# Patient Record
Sex: FEMALE | Race: BLACK OR AFRICAN AMERICAN | NOT HISPANIC OR LATINO
[De-identification: names, ages, dates, MRNs, and addresses within clinical notes are randomized per-mention and may not be internally consistent; named-entity substitution may affect disease eponyms.]

---

## 2017-01-12 LAB
INR PPP: 2.16 RATIO
PT BLD: 24.6 SEC

## 2017-01-13 ENCOUNTER — MESSAGE (OUTPATIENT)
Age: 32
End: 2017-01-13

## 2017-01-18 LAB
INR PPP: 2.86 RATIO
PT BLD: 32.7 SEC

## 2017-02-01 LAB
INR PPP: 2.16 RATIO
PT BLD: 24.6 SEC

## 2017-02-02 ENCOUNTER — RX RENEWAL (OUTPATIENT)
Age: 32
End: 2017-02-02

## 2017-02-14 ENCOUNTER — APPOINTMENT (OUTPATIENT)
Dept: VASCULAR SURGERY | Facility: CLINIC | Age: 32
End: 2017-02-14

## 2017-02-14 ENCOUNTER — APPOINTMENT (OUTPATIENT)
Age: 32
End: 2017-02-14

## 2017-03-10 ENCOUNTER — MESSAGE (OUTPATIENT)
Age: 32
End: 2017-03-10

## 2017-04-25 ENCOUNTER — MESSAGE (OUTPATIENT)
Age: 32
End: 2017-04-25

## 2017-05-08 ENCOUNTER — FORM ENCOUNTER (OUTPATIENT)
Age: 32
End: 2017-05-08

## 2017-05-09 ENCOUNTER — APPOINTMENT (OUTPATIENT)
Age: 32
End: 2017-05-09

## 2017-07-10 ENCOUNTER — APPOINTMENT (OUTPATIENT)
Dept: VASCULAR SURGERY | Facility: CLINIC | Age: 32
End: 2017-07-10

## 2017-07-25 ENCOUNTER — MESSAGE (OUTPATIENT)
Age: 32
End: 2017-07-25

## 2017-08-01 ENCOUNTER — MESSAGE (OUTPATIENT)
Age: 32
End: 2017-08-01

## 2017-08-10 ENCOUNTER — RX RENEWAL (OUTPATIENT)
Age: 32
End: 2017-08-10

## 2017-08-16 ENCOUNTER — MESSAGE (OUTPATIENT)
Age: 32
End: 2017-08-16

## 2017-08-24 ENCOUNTER — FORM ENCOUNTER (OUTPATIENT)
Age: 32
End: 2017-08-24

## 2017-08-25 ENCOUNTER — APPOINTMENT (OUTPATIENT)
Age: 32
End: 2017-08-25
Payer: MEDICAID

## 2017-08-25 PROCEDURE — 36902Z: CUSTOM

## 2017-09-18 ENCOUNTER — FORM ENCOUNTER (OUTPATIENT)
Age: 32
End: 2017-09-18

## 2017-09-19 ENCOUNTER — APPOINTMENT (OUTPATIENT)
Age: 32
End: 2017-09-19
Payer: MEDICAID

## 2017-09-19 PROCEDURE — 36902Z: CUSTOM

## 2017-09-19 PROCEDURE — 36907Z: CUSTOM | Mod: 59

## 2017-09-19 PROCEDURE — 36215Z: CUSTOM | Mod: 59

## 2017-10-26 ENCOUNTER — APPOINTMENT (OUTPATIENT)
Dept: VASCULAR SURGERY | Facility: CLINIC | Age: 32
End: 2017-10-26

## 2017-11-15 ENCOUNTER — RX RENEWAL (OUTPATIENT)
Age: 32
End: 2017-11-15

## 2017-11-17 ENCOUNTER — RX RENEWAL (OUTPATIENT)
Age: 32
End: 2017-11-17

## 2017-11-21 ENCOUNTER — APPOINTMENT (OUTPATIENT)
Dept: VASCULAR SURGERY | Facility: CLINIC | Age: 32
End: 2017-11-21

## 2017-11-22 ENCOUNTER — RX RENEWAL (OUTPATIENT)
Age: 32
End: 2017-11-22

## 2017-11-24 ENCOUNTER — CHART COPY (OUTPATIENT)
Age: 32
End: 2017-11-24

## 2017-11-24 ENCOUNTER — MESSAGE (OUTPATIENT)
Age: 32
End: 2017-11-24

## 2017-11-24 LAB
INR PPP: 5.14
PT BLD: 60

## 2018-01-04 ENCOUNTER — APPOINTMENT (OUTPATIENT)
Dept: OBGYN | Facility: CLINIC | Age: 33
End: 2018-01-04

## 2018-01-09 ENCOUNTER — LABORATORY RESULT (OUTPATIENT)
Age: 33
End: 2018-01-09

## 2018-01-09 ENCOUNTER — APPOINTMENT (OUTPATIENT)
Dept: OBGYN | Facility: CLINIC | Age: 33
End: 2018-01-09
Payer: MEDICAID

## 2018-01-09 ENCOUNTER — OUTPATIENT (OUTPATIENT)
Dept: OUTPATIENT SERVICES | Facility: HOSPITAL | Age: 33
LOS: 1 days | End: 2018-01-09
Payer: MEDICAID

## 2018-01-09 VITALS
WEIGHT: 190.25 LBS | DIASTOLIC BLOOD PRESSURE: 88 MMHG | HEIGHT: 59 IN | BODY MASS INDEX: 38.36 KG/M2 | SYSTOLIC BLOOD PRESSURE: 140 MMHG

## 2018-01-09 DIAGNOSIS — N76.0 ACUTE VAGINITIS: ICD-10-CM

## 2018-01-09 DIAGNOSIS — N83.209 UNSPECIFIED OVARIAN CYST, UNSPECIFIED SIDE: ICD-10-CM

## 2018-01-09 DIAGNOSIS — Z30.431 ENCOUNTER FOR ROUTINE CHECKING OF INTRAUTERINE CONTRACEPTIVE DEVICE: ICD-10-CM

## 2018-01-09 DIAGNOSIS — Z01.419 ENCOUNTER FOR GYNECOLOGICAL EXAMINATION (GENERAL) (ROUTINE) W/OUT ABNORMAL FINDINGS: ICD-10-CM

## 2018-01-09 DIAGNOSIS — Z98.89 OTHER SPECIFIED POSTPROCEDURAL STATES: Chronic | ICD-10-CM

## 2018-01-09 DIAGNOSIS — N83.201 UNSPECIFIED OVARIAN CYST, RIGHT SIDE: ICD-10-CM

## 2018-01-09 DIAGNOSIS — N83.202 UNSPECIFIED OVARIAN CYST, RIGHT SIDE: ICD-10-CM

## 2018-01-09 PROCEDURE — 99395 PREV VISIT EST AGE 18-39: CPT | Mod: NC

## 2018-01-09 PROCEDURE — G0463: CPT

## 2018-01-09 PROCEDURE — 87624 HPV HI-RISK TYP POOLED RSLT: CPT

## 2018-01-10 LAB
C TRACH RRNA SPEC QL NAA+PROBE: SIGNIFICANT CHANGE UP
HPV HIGH+LOW RISK DNA PNL CVX: SIGNIFICANT CHANGE UP
N GONORRHOEA RRNA SPEC QL NAA+PROBE: SIGNIFICANT CHANGE UP
SPECIMEN SOURCE: SIGNIFICANT CHANGE UP

## 2018-01-11 LAB — CYTOLOGY SPEC DOC CYTO: SIGNIFICANT CHANGE UP

## 2018-01-12 DIAGNOSIS — Z30.431 ENCOUNTER FOR ROUTINE CHECKING OF INTRAUTERINE CONTRACEPTIVE DEVICE: ICD-10-CM

## 2018-01-12 DIAGNOSIS — N83.209 UNSPECIFIED OVARIAN CYST, UNSPECIFIED SIDE: ICD-10-CM

## 2018-01-12 DIAGNOSIS — Z01.419 ENCOUNTER FOR GYNECOLOGICAL EXAMINATION (GENERAL) (ROUTINE) WITHOUT ABNORMAL FINDINGS: ICD-10-CM

## 2018-01-18 ENCOUNTER — APPOINTMENT (OUTPATIENT)
Dept: ULTRASOUND IMAGING | Facility: IMAGING CENTER | Age: 33
End: 2018-01-18
Payer: MEDICAID

## 2018-01-18 ENCOUNTER — OUTPATIENT (OUTPATIENT)
Dept: OUTPATIENT SERVICES | Facility: HOSPITAL | Age: 33
LOS: 1 days | End: 2018-01-18
Payer: MEDICAID

## 2018-01-18 DIAGNOSIS — N83.201 UNSPECIFIED OVARIAN CYST, RIGHT SIDE: ICD-10-CM

## 2018-01-18 DIAGNOSIS — N83.202 UNSPECIFIED OVARIAN CYST, LEFT SIDE: ICD-10-CM

## 2018-01-18 DIAGNOSIS — Z98.89 OTHER SPECIFIED POSTPROCEDURAL STATES: Chronic | ICD-10-CM

## 2018-01-18 PROCEDURE — 76830 TRANSVAGINAL US NON-OB: CPT

## 2018-01-18 PROCEDURE — 76856 US EXAM PELVIC COMPLETE: CPT

## 2018-01-18 PROCEDURE — 76830 TRANSVAGINAL US NON-OB: CPT | Mod: 26

## 2018-01-18 PROCEDURE — 76856 US EXAM PELVIC COMPLETE: CPT | Mod: 26

## 2018-01-19 ENCOUNTER — OUTPATIENT (OUTPATIENT)
Dept: OUTPATIENT SERVICES | Facility: HOSPITAL | Age: 33
LOS: 1 days | Discharge: HOME | End: 2018-01-19

## 2018-01-19 DIAGNOSIS — Z98.89 OTHER SPECIFIED POSTPROCEDURAL STATES: Chronic | ICD-10-CM

## 2018-01-19 DIAGNOSIS — D68.59 OTHER PRIMARY THROMBOPHILIA: ICD-10-CM

## 2018-01-23 ENCOUNTER — RESULT REVIEW (OUTPATIENT)
Age: 33
End: 2018-01-23

## 2018-01-23 ENCOUNTER — APPOINTMENT (OUTPATIENT)
Dept: HEMATOLOGY ONCOLOGY | Facility: CLINIC | Age: 33
End: 2018-01-23
Payer: MEDICAID

## 2018-01-23 VITALS
TEMPERATURE: 98.6 F | DIASTOLIC BLOOD PRESSURE: 87 MMHG | OXYGEN SATURATION: 98 % | HEART RATE: 96 BPM | BODY MASS INDEX: 37.85 KG/M2 | SYSTOLIC BLOOD PRESSURE: 132 MMHG | RESPIRATION RATE: 16 BRPM | WEIGHT: 187.39 LBS

## 2018-01-23 DIAGNOSIS — Z51.81 ENCOUNTER FOR THERAPEUTIC DRUG LVL MONITORING: ICD-10-CM

## 2018-01-23 DIAGNOSIS — Z79.01 ENCOUNTER FOR THERAPEUTIC DRUG LVL MONITORING: ICD-10-CM

## 2018-01-23 LAB
BASOPHILS # BLD AUTO: 0.1 K/UL — SIGNIFICANT CHANGE UP (ref 0–0.2)
BASOPHILS NFR BLD AUTO: 1.1 % — SIGNIFICANT CHANGE UP (ref 0–2)
EOSINOPHIL # BLD AUTO: 0.4 K/UL — SIGNIFICANT CHANGE UP (ref 0–0.5)
EOSINOPHIL NFR BLD AUTO: 6.2 % — HIGH (ref 0–6)
HCT VFR BLD CALC: 25.3 % — LOW (ref 34.5–45)
HGB BLD-MCNC: 8.9 G/DL — LOW (ref 11.5–15.5)
INR PPP: 2.82 RATIO
LYMPHOCYTES # BLD AUTO: 1.4 K/UL — SIGNIFICANT CHANGE UP (ref 1–3.3)
LYMPHOCYTES # BLD AUTO: 20 % — SIGNIFICANT CHANGE UP (ref 13–44)
MCHC RBC-ENTMCNC: 34.6 PG — HIGH (ref 27–34)
MCHC RBC-ENTMCNC: 35.1 G/DL — SIGNIFICANT CHANGE UP (ref 32–36)
MCV RBC AUTO: 98.5 FL — SIGNIFICANT CHANGE UP (ref 80–100)
MONOCYTES # BLD AUTO: 0.5 K/UL — SIGNIFICANT CHANGE UP (ref 0–0.9)
MONOCYTES NFR BLD AUTO: 6.3 % — SIGNIFICANT CHANGE UP (ref 2–14)
NEUTROPHILS # BLD AUTO: 4.8 K/UL — SIGNIFICANT CHANGE UP (ref 1.8–7.4)
NEUTROPHILS NFR BLD AUTO: 66.4 % — SIGNIFICANT CHANGE UP (ref 43–77)
PLATELET # BLD AUTO: 233 K/UL — SIGNIFICANT CHANGE UP (ref 150–400)
PT BLD: 32.6 SEC
RBC # BLD: 2.56 M/UL — LOW (ref 3.8–5.2)
RBC # FLD: 13.8 % — SIGNIFICANT CHANGE UP (ref 10.3–14.5)
WBC # BLD: 7.2 K/UL — SIGNIFICANT CHANGE UP (ref 3.8–10.5)
WBC # FLD AUTO: 7.2 K/UL — SIGNIFICANT CHANGE UP (ref 3.8–10.5)

## 2018-01-23 PROCEDURE — 99215 OFFICE O/P EST HI 40 MIN: CPT

## 2018-01-23 RX ORDER — OMEPRAZOLE 40 MG/1
40 CAPSULE, DELAYED RELEASE ORAL
Qty: 60 | Refills: 0 | Status: DISCONTINUED | COMMUNITY
Start: 2017-12-15

## 2018-01-24 LAB
ALBUMIN SERPL ELPH-MCNC: 4.2 G/DL
ALP BLD-CCNC: 71 U/L
ALT SERPL-CCNC: 4 U/L
ANION GAP SERPL CALC-SCNC: 17 MMOL/L
AST SERPL-CCNC: 11 U/L
BILIRUB SERPL-MCNC: 0.7 MG/DL
BUN SERPL-MCNC: 23 MG/DL
CALCIUM SERPL-MCNC: 8.9 MG/DL
CHLORIDE SERPL-SCNC: 93 MMOL/L
CO2 SERPL-SCNC: 31 MMOL/L
CREAT SERPL-MCNC: 8.25 MG/DL
GLUCOSE SERPL-MCNC: 106 MG/DL
LDH SERPL-CCNC: 212 U/L
POTASSIUM SERPL-SCNC: 4.1 MMOL/L
PROT SERPL-MCNC: 7.7 G/DL
SODIUM SERPL-SCNC: 141 MMOL/L

## 2018-04-26 ENCOUNTER — FORM ENCOUNTER (OUTPATIENT)
Age: 33
End: 2018-04-26

## 2018-04-27 ENCOUNTER — APPOINTMENT (OUTPATIENT)
Dept: ENDOVASCULAR SURGERY | Facility: CLINIC | Age: 33
End: 2018-04-27
Payer: MEDICAID

## 2018-04-27 PROCEDURE — 36906Z: CUSTOM

## 2018-04-27 PROCEDURE — 36215Z: CUSTOM | Mod: 59

## 2018-05-07 ENCOUNTER — APPOINTMENT (OUTPATIENT)
Dept: VASCULAR SURGERY | Facility: CLINIC | Age: 33
End: 2018-05-07

## 2018-05-14 ENCOUNTER — APPOINTMENT (OUTPATIENT)
Dept: VASCULAR SURGERY | Facility: CLINIC | Age: 33
End: 2018-05-14
Payer: MEDICAID

## 2018-05-14 PROCEDURE — G0365: CPT | Mod: 59

## 2018-05-14 PROCEDURE — 99214 OFFICE O/P EST MOD 30 MIN: CPT

## 2018-05-14 PROCEDURE — 93990 DOPPLER FLOW TESTING: CPT

## 2018-06-21 ENCOUNTER — FORM ENCOUNTER (OUTPATIENT)
Age: 33
End: 2018-06-21

## 2018-06-22 ENCOUNTER — APPOINTMENT (OUTPATIENT)
Dept: ENDOVASCULAR SURGERY | Facility: CLINIC | Age: 33
End: 2018-06-22
Payer: MEDICAID

## 2018-06-22 PROCEDURE — 36902Z: CUSTOM

## 2018-07-23 ENCOUNTER — APPOINTMENT (OUTPATIENT)
Dept: ENDOVASCULAR SURGERY | Facility: CLINIC | Age: 33
End: 2018-07-23
Payer: MEDICAID

## 2018-07-23 PROCEDURE — 36906Z: CUSTOM

## 2018-07-27 ENCOUNTER — INBOUND DOCUMENT (OUTPATIENT)
Age: 33
End: 2018-07-27

## 2018-09-03 ENCOUNTER — FORM ENCOUNTER (OUTPATIENT)
Age: 33
End: 2018-09-03

## 2018-09-04 ENCOUNTER — APPOINTMENT (OUTPATIENT)
Dept: ENDOVASCULAR SURGERY | Facility: CLINIC | Age: 33
End: 2018-09-04
Payer: MEDICAID

## 2018-09-04 PROCEDURE — 36902Z: CUSTOM

## 2018-09-06 ENCOUNTER — RX RENEWAL (OUTPATIENT)
Age: 33
End: 2018-09-06

## 2018-09-10 ENCOUNTER — RX RENEWAL (OUTPATIENT)
Age: 33
End: 2018-09-10

## 2018-09-14 RX ORDER — CINACALCET HYDROCHLORIDE 30 MG/1
30 TABLET, COATED ORAL
Qty: 90 | Refills: 3 | Status: ACTIVE | COMMUNITY
Start: 2017-10-09 | End: 1900-01-01

## 2018-09-20 ENCOUNTER — APPOINTMENT (OUTPATIENT)
Dept: VASCULAR SURGERY | Facility: CLINIC | Age: 33
End: 2018-09-20

## 2018-11-18 ENCOUNTER — FORM ENCOUNTER (OUTPATIENT)
Age: 33
End: 2018-11-18

## 2018-11-19 ENCOUNTER — APPOINTMENT (OUTPATIENT)
Dept: ENDOVASCULAR SURGERY | Facility: CLINIC | Age: 33
End: 2018-11-19
Payer: MEDICAID

## 2018-11-19 PROCEDURE — 36906Z: CUSTOM

## 2018-11-19 PROCEDURE — 36215Z: CUSTOM | Mod: 59

## 2018-12-06 ENCOUNTER — RX RENEWAL (OUTPATIENT)
Age: 33
End: 2018-12-06

## 2018-12-17 ENCOUNTER — APPOINTMENT (OUTPATIENT)
Dept: VASCULAR SURGERY | Facility: CLINIC | Age: 33
End: 2018-12-17

## 2018-12-26 ENCOUNTER — MEDICATION RENEWAL (OUTPATIENT)
Age: 33
End: 2018-12-26

## 2019-01-24 ENCOUNTER — APPOINTMENT (OUTPATIENT)
Dept: VASCULAR SURGERY | Facility: CLINIC | Age: 34
End: 2019-01-24

## 2019-04-11 ENCOUNTER — FORM ENCOUNTER (OUTPATIENT)
Age: 34
End: 2019-04-11

## 2019-04-12 ENCOUNTER — APPOINTMENT (OUTPATIENT)
Dept: ENDOVASCULAR SURGERY | Facility: CLINIC | Age: 34
End: 2019-04-12
Payer: MEDICAID

## 2019-04-12 VITALS
HEART RATE: 84 BPM | OXYGEN SATURATION: 100 % | DIASTOLIC BLOOD PRESSURE: 77 MMHG | SYSTOLIC BLOOD PRESSURE: 110 MMHG | RESPIRATION RATE: 18 BRPM | TEMPERATURE: 98.4 F

## 2019-04-12 DIAGNOSIS — T82.868A THROMBOSIS DUE VASCULAR PROSTHETIC DEVICES, IMPLANTS AND GRAFTS, INITIAL ENCOUNTER: ICD-10-CM

## 2019-04-12 DIAGNOSIS — Z99.2 END STAGE RENAL DISEASE: ICD-10-CM

## 2019-04-12 DIAGNOSIS — N18.6 END STAGE RENAL DISEASE: ICD-10-CM

## 2019-04-12 PROCEDURE — 36905Z: CUSTOM

## 2019-04-12 PROCEDURE — 36215Z: CUSTOM | Mod: 59

## 2019-04-12 RX ORDER — WARFARIN 1 MG/1
1 TABLET ORAL
Qty: 30 | Refills: 0 | Status: DISCONTINUED | COMMUNITY
Start: 2017-11-15 | End: 2019-04-12

## 2019-04-12 NOTE — DISCUSSION/SUMMARY
[Post Fistulogram/Intervention] : Post Fistulogram/Intervention: Site check for bleeding/hematoma, thrill/bruit. Vitals signs: On admission, then q15 mins x 2 [Resume diet] : resume diet [INR] : INR

## 2019-04-16 NOTE — PROCEDURE
[FreeTextEntry3] : Dr Mora accessed graft with Speedlyser 10cm - TPA injected 2mg IVP [FreeTextEntry1] : Thrombectomy Right arm AV Graft/stent Yes

## 2019-04-16 NOTE — PAST MEDICAL HISTORY
[No therapy indicated for cases scheduled for less than one hour] : No therapy indicated for cases scheduled for less than one hour. [Major surgery] : Major surgery [FreeTextEntry1] : Malignant Hyperthermia Screening Tool and Risk of Bleeding Assessment\par \par Ms. MAHAD MÁRQUEZ denies family history of unexpected death following Anesthesia or Exercise.\par Denies Family history of Malignant Hyperthermia, Muscle or Neuromuscular disorder and High Temperature following exercise.\par \par Ms. MAHAD MÁRQUEZ denies history of Muscle Spasm, Dark or Chocolate - Colored urine and Unanticipated fever immediately following anesthesia or serious exercise. \par Ms. MÁRQUEZ also denies bleeding tendencies/ Risks of Bleeding.\par

## 2019-04-16 NOTE — HISTORY OF PRESENT ILLNESS
[] : right brachiocephalic fistula [FreeTextEntry1] : alert and oriented \par accompanied by \par no meds this AM\par held Coumadin 2 days INR=1.1 [FreeTextEntry4] : 4-11-19 [FreeTextEntry6] : Dr Mcnair [FreeTextEntry5] : 4-11-19

## 2019-05-14 ENCOUNTER — EMERGENCY (EMERGENCY)
Facility: HOSPITAL | Age: 34
LOS: 1 days | Discharge: HOME | End: 2019-05-14
Attending: EMERGENCY MEDICINE
Payer: MEDICAID

## 2019-05-14 VITALS
HEART RATE: 107 BPM | HEIGHT: 60 IN | RESPIRATION RATE: 16 BRPM | SYSTOLIC BLOOD PRESSURE: 153 MMHG | TEMPERATURE: 98 F | OXYGEN SATURATION: 99 % | WEIGHT: 184.97 LBS | DIASTOLIC BLOOD PRESSURE: 83 MMHG

## 2019-05-14 VITALS
SYSTOLIC BLOOD PRESSURE: 129 MMHG | HEART RATE: 97 BPM | TEMPERATURE: 99 F | DIASTOLIC BLOOD PRESSURE: 79 MMHG | RESPIRATION RATE: 18 BRPM | OXYGEN SATURATION: 97 %

## 2019-05-14 DIAGNOSIS — Z98.89 OTHER SPECIFIED POSTPROCEDURAL STATES: Chronic | ICD-10-CM

## 2019-05-14 PROCEDURE — 99284 EMERGENCY DEPT VISIT MOD MDM: CPT

## 2019-05-14 PROCEDURE — 99283 EMERGENCY DEPT VISIT LOW MDM: CPT

## 2019-05-14 RX ORDER — OXYCODONE HYDROCHLORIDE 5 MG/1
5 TABLET ORAL ONCE
Refills: 0 | Status: DISCONTINUED | OUTPATIENT
Start: 2019-05-14 | End: 2019-05-14

## 2019-05-14 RX ORDER — OXYCODONE HYDROCHLORIDE 5 MG/1
1 TABLET ORAL
Qty: 12 | Refills: 0
Start: 2019-05-14 | End: 2019-05-17

## 2019-05-14 RX ADMIN — OXYCODONE HYDROCHLORIDE 5 MILLIGRAM(S): 5 TABLET ORAL at 21:25

## 2019-05-14 NOTE — ED PROVIDER NOTE - PMH
Dialysis care  MWF since 1/12  Gallstones    Kidney failure    Ureteral stent displacement  removed 3/2013

## 2019-05-14 NOTE — ED PROVIDER NOTE - PSH
AVF (arteriovenous fistula)  Right forearm AVF with PTFE 9-  History of     S/P appendectomy    S/P cystoscopy  bladder reduction  S/P Nephrectomy  right side at 3mos old

## 2019-05-14 NOTE — ED PROVIDER NOTE - PROGRESS NOTE DETAILS
AK: Per OB, patient can be discharged, f/u in clinic on fri. Bean MENDIETA: per gyn, patient should continue sitz baths, follow up in clinic.

## 2019-05-14 NOTE — ED PROVIDER NOTE - CLINICAL SUMMARY MEDICAL DECISION MAKING FREE TEXT BOX
32yo F hx ESRD on HD m/w/fri, protein c deficiency on coumadin presenting with vaginal pain and swelling for 3 days. concern for bartholin gland cyst. OB consult. Labs, INR.

## 2019-05-14 NOTE — ED ADULT NURSE NOTE - CHPI ED NUR SYMPTOMS NEG
no vaginal discharge/no fever/no coffee grounds emesis/no nausea/no back pain/no vomiting/no abdominal pain/no discharge

## 2019-05-14 NOTE — ED PROVIDER NOTE - PHYSICAL EXAMINATION
Gen: No acute distress, alert, cooperative  HENT: Normocephalic, atraumatic.  Eyes: PERRLA, EOMI    Resp: CTAB, non-labored, speaking without difficulty on room air, no wheeze  CV: rrr, no murmur  Abd: soft, NTND, no rebound or guarding  : Chaperone MARICHUY De Paz. Right inferior pole of the vagina with area of swelling and tenderness. From palpation on the surface feels about 2cm across of induration. No groin tenderness, no hernia  MSK: No CVAT bilaterally  Skin: warm and dry  Neuro: AOx3, speech is fluent and appropriate, no focal deficits  Psych: Normal affect

## 2019-05-14 NOTE — CONSULT NOTE ADULT - SUBJECTIVE AND OBJECTIVE BOX
Gynecology Consult Note    33y yo  (LMP ) presenting with pelvic pain x3 days.  Patient with right labial pain and fullness, started feeling suprapubic then pain migrated to right posterior labia, no discharge from site.  Pain improves with PO meds.  No discharge, no bleeding amenorrheic since Mirena placement ), no abdominal pain.  Denies fevers/chills.  Mutually monogamous with , declines history of STDs.    OB/GYN HISTORY:   C/s at 26 weeks for unclear indications, fetal anomalies (not specified by patient), fetal loss at 2 months  SAB at 19 weeks following  labor  Denies history of STDs, fibroids, cysts, no history of bartholin's abscesses    PAST MEDICAL & SURGICAL HISTORY:  ESRD on dialysis, L kidney (s/p R nephrectomy), ESRD 2/2 ureteral reflux per patient  Ureteral stent displacement: removed 3/2013  Gallstones  Dialysis care: MWF since   Kidney failure  History of   AVF (arteriovenous fistula): Right forearm AVF with PTFE 9-  S/P cystoscopy: bladder reduction  S/P appendectomy  S/P Nephrectomy: right side at 3mos old    MEDICATIONS  (STANDING):  Warfarin  Renvela  Sensipar    Allergies  No Known Drug Allergies  Shrimp (angioedema)    Exam  Vital Signs Last 24 Hrs  T(C): 37.1 (14 May 2019 20:37), Max: 37.1 (14 May 2019 20:37)  T(F): 98.8 (14 May 2019 20:37), Max: 98.8 (14 May 2019 20:37)  HR: 97 (14 May 2019 20:37) (97 - 107)  BP: 129/79 (14 May 2019 20:37) (129/79 - 153/83)  RR: 18 (14 May 2019 20:37) (16 - 18)  SpO2: 97% (14 May 2019 20:37) (97% - 99%)  Gen: NAD, A&O  Abd: soft, nontender, nondistended  Pelvic: External genitalia grossly normal, some swelling noted right posterior labia.  Approximately 2cm circumscribed collection, tender, at 7 o'clock, consistent with Bartholin's gland abscess.  Abscess well contained, deep, not come to head.  Internal exam with normal cervix, physiologic discharge.  Mobile uterus, adenxa nonpalpable bilaterally.   Ext: nontender bilaterally

## 2019-05-14 NOTE — ED PROVIDER NOTE - ATTENDING CONTRIBUTION TO CARE
Patient is a 34 yo F with history of protein C deficiency on coumadin, ESRD on HD M/W/F here for vaginal pain and swelling x 3 days. No trauma. Patient states she noticed swelling and pain on the right lower portion of vagina. Pain is worse with palpation, movement. Denies a history of STDS. No vaginal discharge, urinary symptoms, fevers or chills. No prior episodes.     VS noted  Gen. no acute distress, Non toxic   HEENT: EOMI, mmm  Lungs: CTAB/L no C/ W /R   CVS: RRR   Abd; Soft non tender, non distended   : normal external genitalia - right inferior pole, palpable tender swelling at vaginal introitus about 2-3 cm, no skin changes  Ext: no edema  Skin: no rash  Neuro AAOx3 non focal clear speech  a/p: Bartholin's cyst/ abscess - this is deep inside introitus. Will consult GYN for recommendations.   - Bean MENDIETA

## 2019-05-14 NOTE — ED PROVIDER NOTE - OBJECTIVE STATEMENT
32yo F hx ESRD on HD m/w/fri, protein c deficiency on coumadin presenting with vaginal pain and swelling for 3 days. Seemed to start from right groin but pain has been in the right inferior pole of the vagina. Worse with movement. Denies fevers, chills, abdominal pain, dysuria. Got HD yesterday and had INR checked, unsure of level.

## 2019-05-14 NOTE — ED ADULT NURSE NOTE - OBJECTIVE STATEMENT
33 year old A&Ox3 female presents ambulatory with steady coordinated gait complaining of right labia pain x 3 days. PMH ESRD on HD MonWedFri, fistula noted to right arm. Patient states pain started in right groin area and now has traveled to right inner labia. + swelling noted to right labia, skin intact, no redness noted. Confirms IUD use, denies abnormal discharge or periods. Denies fevers, chills, abdominal pain, urinary symptoms, CP, SOB, n/v/d, numbness, tingling in upper and lower extremities, HA, blurry vision. VSS updated on plan of care.

## 2019-05-14 NOTE — CONSULT NOTE ADULT - ASSESSMENT
A/P  33y yo  (LMP ) presenting with pelvic pain x3 days secondary to bartholin's abscess, not yet appropriate for drainage  - Sitz baths 20 mins TID with epsom salt  - PO pain meds Tylenol 975 q6hrs, oxycodone 5mg PO PRN (ED attending to discharge with)  - Follow up in Gyn clinic at [address] [place] [address] [phone]  - Infection precautions given    Discussed with Dr. Kathryn Carr PGY4  [id number] A/P  33y yo  (LMP ) presenting with pelvic pain x3 days secondary to right bartholin's abscess, not yet appropriate for drainage  - Sitz baths 20 mins TID with epsom salt  - PO pain meds Tylenol 975 q6hrs, oxycodone 5mg PO PRN (ED attending to discharge with)  - Follow up in Gyn clinic at [address] [place] [address] [phone]  - Infection precautions given    Discussed with Dr. Kathryn Carr PGY4  [id number]

## 2019-05-16 ENCOUNTER — EMERGENCY (EMERGENCY)
Facility: HOSPITAL | Age: 34
LOS: 1 days | Discharge: HOME | End: 2019-05-16
Attending: EMERGENCY MEDICINE
Payer: MEDICAID

## 2019-05-16 VITALS
DIASTOLIC BLOOD PRESSURE: 82 MMHG | HEART RATE: 111 BPM | WEIGHT: 179.9 LBS | RESPIRATION RATE: 20 BRPM | SYSTOLIC BLOOD PRESSURE: 127 MMHG | TEMPERATURE: 98 F | OXYGEN SATURATION: 98 % | HEIGHT: 60 IN

## 2019-05-16 VITALS
RESPIRATION RATE: 19 BRPM | SYSTOLIC BLOOD PRESSURE: 119 MMHG | DIASTOLIC BLOOD PRESSURE: 72 MMHG | OXYGEN SATURATION: 97 % | TEMPERATURE: 99 F | HEART RATE: 97 BPM

## 2019-05-16 DIAGNOSIS — Z98.89 OTHER SPECIFIED POSTPROCEDURAL STATES: Chronic | ICD-10-CM

## 2019-05-16 PROCEDURE — 99282 EMERGENCY DEPT VISIT SF MDM: CPT

## 2019-05-16 RX ORDER — IBUPROFEN 200 MG
600 TABLET ORAL ONCE
Refills: 0 | Status: DISCONTINUED | OUTPATIENT
Start: 2019-05-16 | End: 2019-05-16

## 2019-05-16 RX ORDER — ACETAMINOPHEN 500 MG
650 TABLET ORAL ONCE
Refills: 0 | Status: DISCONTINUED | OUTPATIENT
Start: 2019-05-16 | End: 2019-05-16

## 2019-05-16 NOTE — ED PROVIDER NOTE - CHIEF COMPLAINT
The patient is a 33y Female complaining of The patient is a 33y Female complaining of labial swelling

## 2019-05-16 NOTE — ED PROVIDER NOTE - OBJECTIVE STATEMENT
Leslie Lopez MD PGY-1 pt is a 32 yo F with PMH HD (MWF, last dialysis yesterday), protein C deficiency on warfarin, p/w progressive R labial swelling x 5 days. Was seen in ED 2 days ago, for bartholin's abscess not yet drainable, told to take Sitz baths and Tylenol and oxycodone PRN. Today took 1000 mg of tylenol at 12PM, and stated that she felt as though the swelling had "popped". Denies n/v/fevers/chills/dysuria.

## 2019-05-16 NOTE — ED PROVIDER NOTE - PHYSICAL EXAMINATION
PHYSICAL EXAM:   General: well-appearing, appears stated age, in no acute distress  HEENT: NC/AT,   Cardiovascular: tachycardic and regular rhythm, + S1/S2, no murmurs, rubs, gallops appreciated  Respiratory: clear to auscultation bilaterally, good aeration bilaterally, nonlabored respirations  Abdominal: soft, nontender, nondistended, no rebound, guarding or rigidity  :   Neuro: Alert and oriented x3. Nonfocal neurologic exam  Psychiatric: appropriate mood and affect.   Skin: appropriate color, warm, dry except as mentioned  -Leslie Lopez PGY-1 PHYSICAL EXAM:   General: well-appearing, appears stated age, in no acute distress  HEENT: NC/AT,   Cardiovascular: tachycardic and regular rhythm, + S1/S2, no murmurs, rubs, gallops appreciated  Respiratory: clear to auscultation bilaterally, good aeration bilaterally, nonlabored respirations  Abdominal: soft, nontender, nondistended, no rebound, guarding or rigidity  : foul smelling pus noted around labia but with no drainable collection, mild tenderness at inferior aspect of right labia minora.   Neuro: Alert and oriented x3. Nonfocal neurologic exam  Psychiatric: appropriate mood and affect.   Skin: appropriate color, warm, dry except as mentioned  -Leslie Lopez PGY-1

## 2019-05-16 NOTE — ED ADULT NURSE REASSESSMENT NOTE - NS ED NURSE REASSESS COMMENT FT1
Received report from Kesha BENEDICT. VSS. Patient resting in stretcher with family at bedside. In no acute distress. Pending urine.

## 2019-05-16 NOTE — ED PROVIDER NOTE - ATTENDING CONTRIBUTION TO CARE
------------ATTENDING NOTE------------   pt w/  returning to ED c/o increased redness/swelling and constant moderate dull ache and spontaneous drainage to R labial abscess  - Pritesh Gay MD   ---------------------------------------------- ------------ATTENDING NOTE------------   pt w/  returning to ED c/o increased redness/swelling and constant moderate dull ache and spontaneous drainage to R labial abscess, exam w/o abscess or cellulitic changes, pain resolved, pt w/ GYN appointment tomorrow, nml VS, compliant w/ HD/medication, in depth dw all about ddx, tx, luu, continued close outpt fu.  - Pritesh Gay MD   ----------------------------------------------

## 2019-05-16 NOTE — ED ADULT NURSE NOTE - OBJECTIVE STATEMENT
c/o labial swelling. No distress. Breathing easy and non labored. Ambulatory. Pt anuric because she's a dialysis pt

## 2019-05-16 NOTE — ED PROVIDER NOTE - NSFOLLOWUPINSTRUCTIONS_ED_ALL_ED_FT
See your GYNECOLOGIST tomorrow as scheduled for follow up.    Continue soaks, warm compresses, loose fitting cotton underwear.    ACETAMINOPHEN as directed for pain -- see medication warnings.    Seek immediate medical care for new/worsening symptoms/concerns.

## 2019-05-16 NOTE — ED PROVIDER NOTE - PMH
Dialysis care  MWF since 1/12  Gallstones    Kidney failure    Protein C deficiency    Ureteral stent displacement  removed 3/2013

## 2019-05-16 NOTE — ED PROVIDER NOTE - NS ED ROS FT
REVIEW OF SYSTEMS:  General:  no fever, no chills  HEENT: no headache  Cardiac: no chest pain  Respiratory: no shortness of breath  Gastrointestinal: no abdominal pain, no nausea, no vomiting, no diarrhea, no melena, no hematochezia   Genitourinary: does not make urine, +labial abscess  Neuro: no focal weakness, no numbness/tingling of the extremities, no decreased sensation  Heme: +protein C deficiency  -Leslie Lopez PGY-1

## 2019-06-11 ENCOUNTER — APPOINTMENT (OUTPATIENT)
Dept: VASCULAR SURGERY | Facility: CLINIC | Age: 34
End: 2019-06-11
Payer: MEDICAID

## 2019-06-11 VITALS
HEART RATE: 85 BPM | TEMPERATURE: 98.4 F | BODY MASS INDEX: 36.29 KG/M2 | HEIGHT: 59 IN | DIASTOLIC BLOOD PRESSURE: 86 MMHG | SYSTOLIC BLOOD PRESSURE: 133 MMHG | WEIGHT: 180 LBS

## 2019-06-11 PROCEDURE — 93990 DOPPLER FLOW TESTING: CPT

## 2019-06-11 PROCEDURE — 99213 OFFICE O/P EST LOW 20 MIN: CPT

## 2019-06-11 NOTE — HISTORY OF PRESENT ILLNESS
[] : in right upper arm [FreeTextEntry1] : 32 yo female with history of esrd on hd via right upper extremity avg presents for follow up \par pt without any complaints at this time\par pt denies any difficulty with avg during hd or bleeding after hd

## 2019-06-11 NOTE — PHYSICAL EXAM
[Thrill] : thrill [Aneurysm] : no aneurysm [Ulcer] : no ulcer [Gangrene] : no gangrene [Normal] : coordination grossly intact, no focal deficits [2+] : right 2+ [de-identified] : intact

## 2019-06-11 NOTE — DISCUSSION/SUMMARY
[FreeTextEntry1] : 32 yo female with pmhx of esrd on hd s/p right upper extremity avg\par duplex shows patent avg with 50-75% stenosis of the proximal ptfe and 50-75% in stent stenosis at the axilla \par given no difficulty with hd will continue to monitor \par pt to follow up in 3 months with repeat duplex

## 2019-08-08 ENCOUNTER — FORM ENCOUNTER (OUTPATIENT)
Age: 34
End: 2019-08-08

## 2019-08-09 ENCOUNTER — APPOINTMENT (OUTPATIENT)
Dept: ENDOVASCULAR SURGERY | Facility: CLINIC | Age: 34
End: 2019-08-09
Payer: MEDICAID

## 2019-08-09 VITALS
HEART RATE: 99 BPM | HEIGHT: 59 IN | BODY MASS INDEX: 37.09 KG/M2 | OXYGEN SATURATION: 98 % | TEMPERATURE: 98.3 F | RESPIRATION RATE: 18 BRPM | DIASTOLIC BLOOD PRESSURE: 57 MMHG | WEIGHT: 184 LBS | SYSTOLIC BLOOD PRESSURE: 87 MMHG

## 2019-08-09 PROCEDURE — 36215Z: CUSTOM | Mod: 59

## 2019-08-09 PROCEDURE — 36902Z: CUSTOM

## 2019-08-09 NOTE — PAST MEDICAL HISTORY
[Increasing age ( >40 years old)] : Increasing age ( >40 years old) [No therapy indicated for cases scheduled for less than one hour] : No therapy indicated for cases scheduled for less than one hour. [FreeTextEntry1] : Malignant Hyperthermia Screening Tool and Risk of Bleeding Assessment \par \par \par \par Ms. MAHAD MÁRQUEZ denies family of unexpected death following Anesthesia or Exercise.\par Denies Family history of Malignant Hyperthermia, Muscle or Neuromuscular disorder and High Temperature  following exercise.\par \par Ms. MAHAD MÁRQUEZ denies history of Muscle Spasm, Dark or Chocolate- Colored and Unanticipated fever immediately following anaesthesia or serious exercise.\par Ms. MAHAD MÁRQUEZ also denies bleeding tendencies/Risks of Bleeding.\par

## 2019-08-09 NOTE — HISTORY OF PRESENT ILLNESS
[] : right brachiocephalic fistula [FreeTextEntry1] : alert and oriented x 3 \par no reported falls \par no medications today\par last Warfarin on Wednesday\par INR 1.3\par \par \par  [FreeTextEntry5] : yesterday at 7 PM [FreeTextEntry4] : today  [FreeTextEntry6] : Dr Mcnair

## 2019-08-09 NOTE — PROCEDURE
[Resume diet] : resume diet [Site check for bleeding/hematoma/thrill/bruit] : Site check for bleeding/hematoma/thrill/bruit [Vital signs on admission the q 15 mins x2] : Vital signs on admission the q 15 mins x2 [FreeTextEntry1] : right arm fistula/fistulogram/angioplasty

## 2019-08-10 ENCOUNTER — INPATIENT (INPATIENT)
Facility: HOSPITAL | Age: 34
LOS: 6 days | Discharge: HOME | DRG: 252 | End: 2019-08-17
Attending: HOSPITALIST | Admitting: HOSPITALIST
Payer: MEDICAID

## 2019-08-10 VITALS
TEMPERATURE: 98 F | HEART RATE: 99 BPM | HEIGHT: 59 IN | RESPIRATION RATE: 18 BRPM | WEIGHT: 184.09 LBS | SYSTOLIC BLOOD PRESSURE: 106 MMHG | OXYGEN SATURATION: 95 % | DIASTOLIC BLOOD PRESSURE: 70 MMHG

## 2019-08-10 DIAGNOSIS — N18.6 END STAGE RENAL DISEASE: ICD-10-CM

## 2019-08-10 DIAGNOSIS — Z98.89 OTHER SPECIFIED POSTPROCEDURAL STATES: Chronic | ICD-10-CM

## 2019-08-10 DIAGNOSIS — Z90.5 ACQUIRED ABSENCE OF KIDNEY: Chronic | ICD-10-CM

## 2019-08-10 DIAGNOSIS — Z98.891 HISTORY OF UTERINE SCAR FROM PREVIOUS SURGERY: Chronic | ICD-10-CM

## 2019-08-10 DIAGNOSIS — T82.868A THROMBOSIS DUE TO VASCULAR PROSTHETIC DEVICES, IMPLANTS AND GRAFTS, INITIAL ENCOUNTER: ICD-10-CM

## 2019-08-10 DIAGNOSIS — E66.9 OBESITY, UNSPECIFIED: ICD-10-CM

## 2019-08-10 DIAGNOSIS — E83.52 HYPERCALCEMIA: ICD-10-CM

## 2019-08-10 LAB
ALBUMIN SERPL ELPH-MCNC: 4.8 G/DL — SIGNIFICANT CHANGE UP (ref 3.3–5)
ALP SERPL-CCNC: 85 U/L — SIGNIFICANT CHANGE UP (ref 40–120)
ALT FLD-CCNC: 7 U/L — LOW (ref 10–45)
ANION GAP SERPL CALC-SCNC: 20 MMOL/L — HIGH (ref 5–17)
APTT BLD: 29 SEC — SIGNIFICANT CHANGE UP (ref 27.5–36.3)
AST SERPL-CCNC: 12 U/L — SIGNIFICANT CHANGE UP (ref 10–40)
BASOPHILS # BLD AUTO: 0.1 K/UL — SIGNIFICANT CHANGE UP (ref 0–0.2)
BASOPHILS NFR BLD AUTO: 1 % — SIGNIFICANT CHANGE UP (ref 0–2)
BILIRUB SERPL-MCNC: 1.1 MG/DL — SIGNIFICANT CHANGE UP (ref 0.2–1.2)
BUN SERPL-MCNC: 37 MG/DL — HIGH (ref 7–23)
CALCIUM SERPL-MCNC: 11 MG/DL — HIGH (ref 8.4–10.5)
CHLORIDE SERPL-SCNC: 86 MMOL/L — LOW (ref 96–108)
CO2 SERPL-SCNC: 28 MMOL/L — SIGNIFICANT CHANGE UP (ref 22–31)
CREAT SERPL-MCNC: 10.91 MG/DL — HIGH (ref 0.5–1.3)
EOSINOPHIL # BLD AUTO: 0.4 K/UL — SIGNIFICANT CHANGE UP (ref 0–0.5)
EOSINOPHIL NFR BLD AUTO: 3.4 % — SIGNIFICANT CHANGE UP (ref 0–6)
GLUCOSE SERPL-MCNC: 88 MG/DL — SIGNIFICANT CHANGE UP (ref 70–99)
HCT VFR BLD CALC: 42.2 % — SIGNIFICANT CHANGE UP (ref 34.5–45)
HGB BLD-MCNC: 14.4 G/DL — SIGNIFICANT CHANGE UP (ref 11.5–15.5)
INR BLD: 1.17 RATIO — HIGH (ref 0.88–1.16)
LYMPHOCYTES # BLD AUTO: 28.5 % — SIGNIFICANT CHANGE UP (ref 13–44)
LYMPHOCYTES # BLD AUTO: 3.1 K/UL — SIGNIFICANT CHANGE UP (ref 1–3.3)
MCHC RBC-ENTMCNC: 34.1 GM/DL — SIGNIFICANT CHANGE UP (ref 32–36)
MCHC RBC-ENTMCNC: 34.9 PG — HIGH (ref 27–34)
MCV RBC AUTO: 103 FL — HIGH (ref 80–100)
MONOCYTES # BLD AUTO: 0.5 K/UL — SIGNIFICANT CHANGE UP (ref 0–0.9)
MONOCYTES NFR BLD AUTO: 4.5 % — SIGNIFICANT CHANGE UP (ref 2–14)
NEUTROPHILS # BLD AUTO: 6.9 K/UL — SIGNIFICANT CHANGE UP (ref 1.8–7.4)
NEUTROPHILS NFR BLD AUTO: 62.7 % — SIGNIFICANT CHANGE UP (ref 43–77)
PLATELET # BLD AUTO: 271 K/UL — SIGNIFICANT CHANGE UP (ref 150–400)
POTASSIUM SERPL-MCNC: 4.3 MMOL/L — SIGNIFICANT CHANGE UP (ref 3.5–5.3)
POTASSIUM SERPL-SCNC: 4.3 MMOL/L — SIGNIFICANT CHANGE UP (ref 3.5–5.3)
PROT SERPL-MCNC: 9.1 G/DL — HIGH (ref 6–8.3)
PROTHROM AB SERPL-ACNC: 13.5 SEC — HIGH (ref 10–12.9)
RBC # BLD: 4.12 M/UL — SIGNIFICANT CHANGE UP (ref 3.8–5.2)
RBC # FLD: 14 % — SIGNIFICANT CHANGE UP (ref 10.3–14.5)
SODIUM SERPL-SCNC: 134 MMOL/L — LOW (ref 135–145)
WBC # BLD: 11 K/UL — HIGH (ref 3.8–10.5)
WBC # FLD AUTO: 11 K/UL — HIGH (ref 3.8–10.5)

## 2019-08-10 PROCEDURE — 93990 DOPPLER FLOW TESTING: CPT | Mod: 26

## 2019-08-10 PROCEDURE — 99285 EMERGENCY DEPT VISIT HI MDM: CPT

## 2019-08-10 PROCEDURE — 99223 1ST HOSP IP/OBS HIGH 75: CPT

## 2019-08-10 RX ORDER — SEVELAMER CARBONATE 2400 MG/1
800 POWDER, FOR SUSPENSION ORAL
Refills: 0 | Status: DISCONTINUED | OUTPATIENT
Start: 2019-08-10 | End: 2019-08-17

## 2019-08-10 RX ORDER — HEPARIN SODIUM 5000 [USP'U]/ML
3000 INJECTION INTRAVENOUS; SUBCUTANEOUS EVERY 6 HOURS
Refills: 0 | Status: DISCONTINUED | OUTPATIENT
Start: 2019-08-10 | End: 2019-08-12

## 2019-08-10 RX ORDER — HEPARIN SODIUM 5000 [USP'U]/ML
INJECTION INTRAVENOUS; SUBCUTANEOUS
Qty: 25000 | Refills: 0 | Status: DISCONTINUED | OUTPATIENT
Start: 2019-08-10 | End: 2019-08-11

## 2019-08-10 RX ORDER — WARFARIN SODIUM 2.5 MG/1
5 TABLET ORAL ONCE
Refills: 0 | Status: COMPLETED | OUTPATIENT
Start: 2019-08-10 | End: 2019-08-10

## 2019-08-10 RX ORDER — HEPARIN SODIUM 5000 [USP'U]/ML
6500 INJECTION INTRAVENOUS; SUBCUTANEOUS EVERY 6 HOURS
Refills: 0 | Status: DISCONTINUED | OUTPATIENT
Start: 2019-08-10 | End: 2019-08-12

## 2019-08-10 RX ADMIN — WARFARIN SODIUM 5 MILLIGRAM(S): 2.5 TABLET ORAL at 23:04

## 2019-08-10 RX ADMIN — HEPARIN SODIUM 1500 UNIT(S)/HR: 5000 INJECTION INTRAVENOUS; SUBCUTANEOUS at 20:32

## 2019-08-10 RX ADMIN — SEVELAMER CARBONATE 800 MILLIGRAM(S): 2400 POWDER, FOR SUSPENSION ORAL at 22:36

## 2019-08-10 NOTE — H&P ADULT - NSICDXPASTSURGICALHX_GEN_ALL_CORE_FT
PAST SURGICAL HISTORY:  History of right nephrectomy PAST SURGICAL HISTORY:  History of right nephrectomy     Previous  section

## 2019-08-10 NOTE — H&P ADULT - NSHPREVIEWOFSYSTEMS_GEN_ALL_CORE
Gen: no changes in weight, fatigue, night sweats, appetite, or fever  Eyes: no changes in vision, diplopia, or floaters  ENT: no epistaxis, sinus pain, gingival bleeding, odynophagia or dysphagia  CV: no CP, SOB, PND, orthopnea, LOC, or palpitations  Resp: no cough, wheezing, or hemoptysis  GI: no abdominal pain, dyspepsia, nausea, vomiting, diarrhea, constipation, hematemesis, hematochezia, or melena  : no dysuria, polyuria, or hematuria  MSK: no arthralgias or joint swelling   Neuro: no gross sensory changes, numbness, focal deficits  Psych: no depression, changes in sleep, changes in concentration, or lack of energy  Heme/Onc: no purpura, petechiae or night sweats  Skin: no pruritus, hair loss or skin lesions

## 2019-08-10 NOTE — H&P ADULT - HISTORY OF PRESENT ILLNESS
33F with PMHx of right nephrectomy when she was a toddler and ESRD (M/W/F for approximately eight years via right AV Graft) presents to [facility] after suspected graft thrombosis. Patient states that she has been on dialysis for approximately eight years. She states that after her right nephrectomy (unsure exact reason why) she suffered from progressive right kidney dysfunction secondary to reflux. Since she has had the right AV graft she has suffered from several thrombotic events, but unsure the number. Because of this her vascular surgeon started her on Warfarin and she is keenly aware of when it is not functioning properly. Patient was last fully dialyzed on 8/9/19 and received a full session. She went home and took a nap and when she woke up she could no longer feel the thrill or hear a bruit (she has her own stethoscope). Patient presented to ED on 8/10/19. While her she had a RUE which showed thrombosis of her graft. She has no complaints herself. She denies pain, nausea, vomiting, decreased PO intake, chest pain, SOB or lower extremity swelling. Vascular surgery saw patient and recommended heparin drip and IR consultation for revision.

## 2019-08-10 NOTE — ED PROVIDER NOTE - PHYSICAL EXAMINATION
Gen: NAD, non-toxic, conversational  Eyes: PERRL, EOMI   HENT: Normocephalic, atraumatic. External ears normal, no rhinorrhea, moist mucous membranes.   CV: RRR, no M/R/G, RUE with vascular graft no audible bruit or palpable thrill  Resp: CTAB, non-labored, speaking without difficulty on room air  Abd: soft, non tender, non rigid, no guarding or rebound tenderness  Back: No CVAT bilaterally, no midline ttp  Skin: dry, wwp   Neuro: AOx3, speech is fluent and appropriate  Psych: Mood okay, affect euthymic

## 2019-08-10 NOTE — ED PROVIDER NOTE - CLINICAL SUMMARY MEDICAL DECISION MAKING FREE TEXT BOX
33F presenting for evaluation of clogged hemodialysis fistula, notes that she had a clogged fistula yesterday and at that time was told it had resolved, but that her INR was 1.3. Will check labs, VA duplex, vascular consult, follow up studies, reassess, dispo per vascular recs. 33F presenting for evaluation of clogged hemodialysis fistula, notes that she had a clogged fistula yesterday and at that time was told it had resolved, but that her INR was 1.3. Will check labs, VA duplex, vascular consult, follow up studies, reassess, dispo per vascular recs.    CÉSAR Sorenson MD: Pt is a 32 y/o female with PMH ESRD on HD M/W/F is sent in for clotted AVF. Pt went for HD yesterday, found to have a clotted AVF, was seen by Dr. Mora yesterday who was able to open up clot and discharged after being found to have a low warfarin level of 1.3. Notes that her stent seems to have clogged again, is due to be dialyzed this upcoming monday, did finish dialysis yesterday, no fevers, chills, nausea, vomiting, diarrhea, constipation, headache, flank pain, or other new worries. 33F presenting for evaluation of clogged hemodialysis fistula, notes that she had a clogged fistula yesterday and at that time was told it had resolved, but that her INR was 1.3. Will check labs, VA duplex, vascular consult, follow up studies, reassess, dispo per vascular recs.    CÉSAR Sorenson MD: Pt is a 34 y/o female with PMH ESRD on HD M/W/F is sent in for clotted AVF. Pt went for HD yesterday, found to have a clotted AVF, was seen by Dr. Mora yesterday who was able to open up clot and discharged.  after INR yesterday 1.3. Notes that her stent seems to have clogged again, is due to be dialyzed this upcoming monday, did finish dialysis yesterday, no fevers, chills, nausea, vomiting, diarrhea, constipation, headache, flank pain, or other new worries. Plan: fistula US, basic labs including coags, vacular surgery consult

## 2019-08-10 NOTE — ED ADULT NURSE NOTE - NSIMPLEMENTINTERV_GEN_ALL_ED
Implemented All Universal Safety Interventions:  [place] to call system. Call bell, personal items and telephone within reach. Instruct patient to call for assistance. Room bathroom lighting operational. Non-slip footwear when patient is off stretcher. Physically safe environment: no spills, clutter or unnecessary equipment. Stretcher in lowest position, wheels locked, appropriate side rails in place.

## 2019-08-10 NOTE — ED PROVIDER NOTE - OBJECTIVE STATEMENT
Pt a M/W/F dialysis patient, followed by Dr. Mora of vascular surgery, was at dialysis on Friday found to have a clogged stent at that time cleared by Dr. Mora and discharged after being found to have a low warfarin level of 1.3. Notes that her stent seems to have clogged again, is due to be dialyzed this upcoming monday, did finish dialysis yesterday, no fevers, chills, nausea, vomiting, diarrhea, constipation, headache, flank pain, or other new worries.

## 2019-08-10 NOTE — PATIENT PROFILE ADULT - NSPROMEDSBROUGHTTOHOSP_GEN_A_NUR
Nursing Note by Colette Stinson RN at 12/22/17 10:42 AM     Author:  Colette Stinson RN Service:  (none) Author Type:  Registered Nurse     Filed:  12/22/17 10:42 AM Encounter Date:  12/22/2017 Status:  Signed     :  Colette Stinson RN (Registered Nurse)            Patient brought to exam room.  Electronically Signed by:    Colette Stinson RN , 12/22/2017  Pt brought back to room, pt name and birthday verified.  Last visit with BRUNA MAST was on No match found  [facility] with [facility] was on 01/07/2016 at 12:15 PM [facility]  Match done based on reference date of today 12/22/17  Michael Neff was offered treatment for pain control:[DW1.1T] Yes.  Patient refused comfort measures to reduce pain.[DW1.1M]              Revision History        User Key Date/Time User Provider Type Action    > DW1.1 12/22/17 10:42 AM Colette Stinson RN Registered Nurse Sign    M - Manual, T - Template             no

## 2019-08-10 NOTE — ED PROVIDER NOTE - PROGRESS NOTE DETAILS
Vascular surgery returns call and states that she should be admitted to medicine for heparin drip. XENA Gallegos, EM PGY3

## 2019-08-10 NOTE — H&P ADULT - PROBLEM SELECTOR PLAN 2
-No indication for urgent dialysis  -Hepatitis B Panel should it become necessary  -Continue with Renvela, PO4 level

## 2019-08-10 NOTE — ED PROVIDER NOTE - NS_EDPROVIDERDISPOUSERTYPE_ED_A_ED
Scribe Attestation (For Scribes USE Only).../Attending Attestation (For Attendings USE Only)... Attending Attestation (For Attendings USE Only)...

## 2019-08-10 NOTE — PATIENT PROFILE ADULT - VISION (WITH CORRECTIVE LENSES IF THE PATIENT USUALLY WEARS THEM):
Normal vision: sees adequately in most situations; can see medication labels, newsprint/pt wears contacts

## 2019-08-10 NOTE — H&P ADULT - NSHPPHYSICALEXAM_GEN_ALL_CORE
VS: Tcurrent: afebrile     BP:  103/75  HR: 88    RR: 16    O2Sat: 98% RA  Gen: female in NAD, appears comfortable, no diaphoresis  HEENT: NCAT, MMM, neck soft and supple  CV: +S1/S2, no m/r/g  Resp: CTAB, no w/r/r  GI: normoactive BS, soft, NTND, no rebounding/guarding  Ext: No LE edema, extremities appear warm and well perfused, right AV grafted noted and no thrill or bruit appreciated  Neuro: AOx3, no focal deficits, CNII-XII grossly intact  Psych: No SI/HI/AVH, appropriate affect  Skin: no petechiae, ecchymosis or maculopapular rash noted

## 2019-08-10 NOTE — H&P ADULT - ASSESSMENT
33F with PMHx of right nephrectomy when she was a toddler and ESRD (M/W/F for approximately eight years via right AV Graft) presents to [facility] after suspected graft thrombosis. 33F with PMHx of right nephrectomy when she was a toddler and ESRD (M/W/F for approximately eight years via right AV Graft) presents to [facility] after suspected graft thrombosis. Patient last fully dialyzed on 8/9/19 and has no complaints or lab abnormalities that would indicate need for urgent hemodialysis. RUE US showed thrombosis of graft. Vascular surgery saw patient and recommended heparin drip and IR consultation for revision.

## 2019-08-10 NOTE — ED ADULT NURSE NOTE - OBJECTIVE STATEMENT
33y f pt with  and sister at bedside c/o right av fistula not working; pt states had regular dialysis yesterday; had fistula flushed; went home took a nap; when woke fistula not working; 33y f pt with  and sister at bedside c/o right av fistula not working; pt states had regular dialysis yesterday; had fistula flushed; went home took a nap; when woke fistula not working; pt has had another fistula stop working on lower right arm; pt states has been  using this fistula for about 4 years; aox3; no resp distress; no sob; no vidal; no chest pain; no abd pain; no n/v/d; no fever/chills; pt states kidney failure due to reflux; pt states does not make urine; safety/comfort maintained

## 2019-08-10 NOTE — ED ADULT NURSE REASSESSMENT NOTE - NS ED NURSE REASSESS COMMENT FT1
Report received from MARICHUY Allison. Pt resting comfortably with family at bedside. VSS. Awaiting US results. Safety maintained at all times, bed in lowest position, call bell in reach. Will continue to monitor closely.

## 2019-08-10 NOTE — H&P ADULT - PROBLEM SELECTOR PLAN 3
-PTH to determine if it is PTH dependent vs. independent  -Asymptomatic right now, no acute intervention  -Patient does have protein gap (>4), but unsure if this is driving the hypercalcemia, will defer SPEP/UPEP for now

## 2019-08-10 NOTE — H&P ADULT - PROBLEM SELECTOR PLAN 1
-Heparin drip with goal PTT 60 to 80 or per nomogram  -Vascular surgery recommendations appreciated: will consult IR for revision  -Will give Warfarin because I don't want patient falling behind, continue with Warfarin 5 mg qhs, goal INR 2-3, likely hold on the night before revision, should be okay if INR close to 1.6

## 2019-08-11 DIAGNOSIS — E88.9 METABOLIC DISORDER, UNSPECIFIED: ICD-10-CM

## 2019-08-11 DIAGNOSIS — N18.6 END STAGE RENAL DISEASE: ICD-10-CM

## 2019-08-11 LAB
ANION GAP SERPL CALC-SCNC: 21 MMOL/L — HIGH (ref 5–17)
APTT BLD: 63.8 SEC — HIGH (ref 27.5–36.3)
APTT BLD: 80.9 SEC — HIGH (ref 27.5–36.3)
BUN SERPL-MCNC: 50 MG/DL — HIGH (ref 7–23)
CALCIUM SERPL-MCNC: 8.9 MG/DL — SIGNIFICANT CHANGE UP (ref 8.4–10.5)
CALCIUM SERPL-MCNC: 9.5 MG/DL — SIGNIFICANT CHANGE UP (ref 8.4–10.5)
CHLORIDE SERPL-SCNC: 87 MMOL/L — LOW (ref 96–108)
CO2 SERPL-SCNC: 27 MMOL/L — SIGNIFICANT CHANGE UP (ref 22–31)
CREAT SERPL-MCNC: 12.72 MG/DL — HIGH (ref 0.5–1.3)
GLUCOSE SERPL-MCNC: 69 MG/DL — LOW (ref 70–99)
HBV CORE AB SER-ACNC: SIGNIFICANT CHANGE UP
HBV SURFACE AB SER-ACNC: REACTIVE
HBV SURFACE AG SER-ACNC: SIGNIFICANT CHANGE UP
HCT VFR BLD CALC: 35.5 % — SIGNIFICANT CHANGE UP (ref 34.5–45)
HGB BLD-MCNC: 12.2 G/DL — SIGNIFICANT CHANGE UP (ref 11.5–15.5)
MCHC RBC-ENTMCNC: 34.4 GM/DL — SIGNIFICANT CHANGE UP (ref 32–36)
MCHC RBC-ENTMCNC: 34.7 PG — HIGH (ref 27–34)
MCV RBC AUTO: 100.9 FL — HIGH (ref 80–100)
PHOSPHATE SERPL-MCNC: 7.3 MG/DL — HIGH (ref 2.5–4.5)
PLATELET # BLD AUTO: 232 K/UL — SIGNIFICANT CHANGE UP (ref 150–400)
POTASSIUM SERPL-MCNC: 4.5 MMOL/L — SIGNIFICANT CHANGE UP (ref 3.5–5.3)
POTASSIUM SERPL-SCNC: 4.5 MMOL/L — SIGNIFICANT CHANGE UP (ref 3.5–5.3)
PTH-INTACT FLD-MCNC: 856 PG/ML — HIGH (ref 15–65)
RBC # BLD: 3.52 M/UL — LOW (ref 3.8–5.2)
RBC # FLD: 14 % — SIGNIFICANT CHANGE UP (ref 10.3–14.5)
SODIUM SERPL-SCNC: 135 MMOL/L — SIGNIFICANT CHANGE UP (ref 135–145)
WBC # BLD: 7.9 K/UL — SIGNIFICANT CHANGE UP (ref 3.8–10.5)
WBC # FLD AUTO: 7.9 K/UL — SIGNIFICANT CHANGE UP (ref 3.8–10.5)

## 2019-08-11 PROCEDURE — 99233 SBSQ HOSP IP/OBS HIGH 50: CPT

## 2019-08-11 RX ORDER — DOXERCALCIFEROL 2.5 UG/1
2 CAPSULE ORAL
Refills: 0 | Status: DISCONTINUED | OUTPATIENT
Start: 2019-08-11 | End: 2019-08-17

## 2019-08-11 RX ORDER — HEPARIN SODIUM 5000 [USP'U]/ML
INJECTION INTRAVENOUS; SUBCUTANEOUS
Qty: 25000 | Refills: 0 | Status: DISCONTINUED | OUTPATIENT
Start: 2019-08-11 | End: 2019-08-12

## 2019-08-11 RX ADMIN — SEVELAMER CARBONATE 800 MILLIGRAM(S): 2400 POWDER, FOR SUSPENSION ORAL at 12:23

## 2019-08-11 RX ADMIN — HEPARIN SODIUM 1500 UNIT(S)/HR: 5000 INJECTION INTRAVENOUS; SUBCUTANEOUS at 01:50

## 2019-08-11 RX ADMIN — SEVELAMER CARBONATE 800 MILLIGRAM(S): 2400 POWDER, FOR SUSPENSION ORAL at 17:52

## 2019-08-11 RX ADMIN — SEVELAMER CARBONATE 800 MILLIGRAM(S): 2400 POWDER, FOR SUSPENSION ORAL at 08:30

## 2019-08-11 RX ADMIN — HEPARIN SODIUM 1500 UNIT(S)/HR: 5000 INJECTION INTRAVENOUS; SUBCUTANEOUS at 09:51

## 2019-08-11 NOTE — CONSULT NOTE ADULT - SUBJECTIVE AND OBJECTIVE BOX
[facility] DIVISION OF KIDNEY DISEASES AND HYPERTENSION -- INITIAL CONSULT NOTE  --------------------------------------------------------------------------------  HPI: Patient is a 33y old F ESRD (M/W/F for approximately eight years via right AV Graft), on coumadin for hypercoagulable state presents to [facility] after suspected graft thrombosis. Since she has had the right AV graft she has suffered from several thrombotic events, but unsure the number, endorses compliance with coumadin. Though she mentions dietary indiscretion,. ingesting lots of leafy greens-> broccoli. Patient was last fully dialyzed on 19 and received a full session. Saw Dr. Dumont that evening as she was having elevated arterial pressures( 250) in the graft during HD She went home and took a nap and when she woke up she could no longer feel the thrill or hear a bruit (she has her own stethoscope). Patient presented to ED on 8/10/19. While her she had a RUE which showed thrombosis of her graft. She has no complaints herself. She denies pain, nausea, vomiting, decreased PO intake, chest pain, SOB or lower extremity swelling. Vascular surgery saw patient and recommended heparin drip and IR consultation for revision. Nephrology consulted for ESRD management           PAST HISTORY  --------------------------------------------------------------------------------  PAST MEDICAL & SURGICAL HISTORY:  ESRD on dialysis  Previous  section  History of right nephrectomy    FAMILY HISTORY:  FH: hypertension  FH: diabetes mellitus    PAST SOCIAL HISTORY:    ALLERGIES & MEDICATIONS  --------------------------------------------------------------------------------  Allergies    No Known Drug Allergies  shellfish (Angioedema)    Intolerances      Standing Inpatient Medications  heparin  Infusion.  Unit(s)/Hr IV Continuous <Continuous>  sevelamer carbonate 800 milliGRAM(s) Oral three times a day with meals    PRN Inpatient Medications  heparin  Injectable 6500 Unit(s) IV Push every 6 hours PRN  heparin  Injectable 3000 Unit(s) IV Push every 6 hours PRN      REVIEW OF SYSTEMS  --------------------------------------------------------------------------------  Gen: No fevers/chills  Head/Eyes/Ears/Mouth: No headache; Normal hearing; Normal vision    Respiratory: No dyspnea, cough  CV: No chest pain  GI: No abdominal pain, diarrhea, constipation, nausea, vomiting  : No increased frequency, dysuria  MSK: No joint pain/swelling; no edema    All other systems were reviewed and are negative, except as noted.    VITALS/PHYSICAL EXAM  --------------------------------------------------------------------------------  T(C): 37.1 (19 @ 11:20), Max: 37.1 (08-10-19 @ 17:20)  HR: 78 (19 @ 11:20) (78 - 99)  BP: 94/66 (19 @ 11:20) (94/66 - 122/-)  RR: 18 (19 @ 11:20) (16 - 18)  SpO2: 97% (19 @ 11:20) (95% - 99%)  Wt(kg): --  Height (cm): 149.86 (08-10-19 @ 16:15)  Weight (kg): 83.3 (08-10-19 @ 21:29)  BMI (kg/m2): 37.1 (08-10-19 @ 21:29)  BSA (m2): 1.78 (08-10-19 @ 21:29)      08-10-19 @ 07:01  -  19 @ 07:00  --------------------------------------------------------  IN: 0 mL / OUT: 0 mL / NET: 0 mL    19 @ 07:01  -  19 @ 14:00  --------------------------------------------------------  IN: 705 mL / OUT: 0 mL / NET: 705 mL      Physical Exam:    	Gen: NAD, well-appearing  	HEENT: PERRL, supple neck, clear oropharynx  	Pulm: CTA B/L  	CV: RRR, S1S2; no rub  	Abd: +BS, soft, nontender/nondistended       	: No suprapubic tenderness  	LE: Warm,ntact strength; no edema  	Neuro: No focal deficits, AOX3,       	Vascular Access: AVG w/o bruit/thrill      LABS/STUDIES  --------------------------------------------------------------------------------              12.2   7.90  >-----------<  232      [19 @ 12:22]              35.5     135  |  87  |  50  ----------------------------<  69      [19 @ 07:32]  4.5   |  27  |  12.72        Ca     9.5     [19 @ 07:32]      Phos  7.3     [19 @ 07:32]    TPro  9.1  /  Alb  4.8  /  TBili  1.1  /  DBili  x   /  AST  12  /  ALT  7   /  AlkPhos  85  [08-10-19 @ 18:26]    PT/INR: PT 13.5 , INR 1.17       [08-10-19 @ 18:26]  PTT: 80.9       [19 @ 07:30]      Creatinine Trend:  SCr 12.72 [ 07:32]  SCr 10.91 [08-10 @ 18:26]        PTH -- (Ca 8.9)      [19 @ 12:16]   856
VASCULAR SURGERY CONSULT NOTE  --------------------------------------------------------------------------------------------    Patient is a 33y old with ESRD who had an AVG placed in her upper right arm 4-5 years ago by Dr. Mora.  She got dialysis on Friday but noticed low flow, so she went to Dr. Mora who performed an angioplasty on the graft.  She then went home and took a nap, but when she woke up she didnt feel a thrill, and she couldnt hear a bruit with her at home stethascope.  She then came to the ED for further management.  Her next scheduled HD is for Monday, and she was last dialyzed Friday.    ROS: 10-system review is otherwise negative except HPI above.      PAST MEDICAL & SURGICAL HISTORY:  Hemodialysis patient    ALLERGIES: Drug Allergies Not Recorded  shellfish (Angioedema)    CURRENT MEDICATIONS  MEDICATIONS (STANDING):   MEDICATIONS (PRN):  --------------------------------------------------------------------------------------------    Vitals:   T(C): 37.1 (08-10-19 @ 17:20), Max: 37.1 (08-10-19 @ 17:20)  HR: 88 (08-10-19 @ 19:17) (88 - 99)  BP: 103/75 (08-10-19 @ 19:17) (101/78 - 106/70)  RR: 16 (08-10-19 @ 19:17) (16 - 18)  SpO2: 98% (08-10-19 @ 19:17) (95% - 98%)  CAPILLARY BLOOD GLUCOSE        CAPILLARY BLOOD GLUCOSE          Height (cm): 149.86 (08-10 @ 16:15)  Weight (kg): 83.5 (08-10 @ 16:15)  BMI (kg/m2): 37.2 (08-10 @ 16:15)  BSA (m2): 1.78 (08-10 @ 16:15)    PHYSICAL EXAM:  General: NAD, Lying in bed comfortably  Neuro: A+Ox3  HEENT: NC/AT  Cardio: Regular rate  Resp: Good effort, CTA b/l  Vascular: RUE with palp radial pulse.  There is no thrill over the graft.  There is a dopplerable signal over the graft.  --------------------------------------------------------------------------------------------    LABS  CBC (08-10 @ 18:26)                              14.4                           11.0<H>  )----------------(  271        62.7  % Neutrophils, 28.5  % Lymphocytes, ANC: 6.9                                 42.2      BMP (08-10 @ 18:26)             134<L>  |  86<L>   |  37<H> 		Ca++ --      Ca 11.0<H>             ---------------------------------( 88    		Mg --                 4.3     |  28      |  10.91<H>			Ph --        LFTs (08-10 @ 18:26)      TPro 9.1<H> / Alb 4.8 / TBili 1.1 / DBili -- / AST 12 / ALT 7<L> / AlkPhos 85    Coags (08-10 @ 18:26)  aPTT 29.0 / INR 1.17<H> / PT 13.5<H>          --------------------------------------------------------------------------------------------    MICROBIOLOGY      --------------------------------------------------------------------------------------------    IMAGING      ASSESSMENT: Patient is a 33y old f with ESRD and a clotted RUE AVG.    PLAN:   - Start heparin gtt as she is on coumadin but is not therapeutic.  Would also dose coumadin tonight.  - Will need thrombectomy by IR.  - Plan to be discussed with fellow

## 2019-08-11 NOTE — CONSULT NOTE ADULT - PROBLEM SELECTOR RECOMMENDATION 2
- Pt Hgb at goal now  - Will hold EPO & venofer at this time, especially in setting of thrombosis  - Pt on venofer 50mcg and EPO 10,000U TIW

## 2019-08-11 NOTE — CONSULT NOTE ADULT - ATTENDING COMMENTS
#ESRD on HD- mwf; last hd friday; hopeful next hd tomorrow after thrombectomy; check BMP in am  #Vascular Access Thrombosis- Had outpt procedure with Dr Mora Friday; noted AVG clotted Friday; Vascu duplex in ER with no flow; She has no bruit and thrill of Right AVG on exam- plan for IR thrombectomy monday; of note is that pt is subtherapeutic on her coumadin; now on heparin drip  #Anemia in CKD on BERNABE as outpatient--> Hb 14, then 12; hold BERNABE and Venofer  #Known Proc C deficiency- on Coumadin; needs follow up with Hematology as outpatient; pt used to follow up with Dr Joseph but lost to follow up since he left.

## 2019-08-11 NOTE — CONSULT NOTE ADULT - PROBLEM SELECTOR RECOMMENDATION 9
- pt ESRD on HD MWF, last HD on 8/9. Consent obtained for HD  - pt will need thrombectomy with IR, plan for HD afterwards if AVG functional   - No urgent indications for HD at this time, not volume overloaded, electrolytes stable  - Will check hep B serologies prior to HD  - C/w Hep GTT at this time for thrombosis, will need to be bridged to coumadin afterwards

## 2019-08-11 NOTE — CONSULT NOTE ADULT - ASSESSMENT
Patient is a 33y old F ESRD (M/W/F for approximately eight years via right AV Graft), on coumadin for hypercoagulable state presents to [facility] after suspected graft thrombosis, Nephrology consulted for ESRD on HD management.

## 2019-08-12 LAB
24R-OH-CALCIDIOL SERPL-MCNC: 17 NG/ML — LOW (ref 30–80)
ANION GAP SERPL CALC-SCNC: 21 MMOL/L — HIGH (ref 5–17)
APTT BLD: 123.2 SEC — CRITICAL HIGH (ref 27.5–36.3)
BUN SERPL-MCNC: 63 MG/DL — HIGH (ref 7–23)
CALCIUM SERPL-MCNC: 9.7 MG/DL — SIGNIFICANT CHANGE UP (ref 8.4–10.5)
CHLORIDE SERPL-SCNC: 86 MMOL/L — LOW (ref 96–108)
CO2 SERPL-SCNC: 27 MMOL/L — SIGNIFICANT CHANGE UP (ref 22–31)
CREAT SERPL-MCNC: 15.45 MG/DL — HIGH (ref 0.5–1.3)
GLUCOSE SERPL-MCNC: 85 MG/DL — SIGNIFICANT CHANGE UP (ref 70–99)
HBV SURFACE AB SER-ACNC: 118.2 MIU/ML — SIGNIFICANT CHANGE UP
HBV SURFACE AB SER-ACNC: REACTIVE
HCT VFR BLD CALC: 33.6 % — LOW (ref 34.5–45)
HGB BLD-MCNC: 11.6 G/DL — SIGNIFICANT CHANGE UP (ref 11.5–15.5)
INR BLD: 1.23 RATIO — HIGH (ref 0.88–1.16)
MAGNESIUM SERPL-MCNC: 2.4 MG/DL — SIGNIFICANT CHANGE UP (ref 1.6–2.6)
MCHC RBC-ENTMCNC: 34.2 PG — HIGH (ref 27–34)
MCHC RBC-ENTMCNC: 34.5 GM/DL — SIGNIFICANT CHANGE UP (ref 32–36)
MCV RBC AUTO: 99.1 FL — SIGNIFICANT CHANGE UP (ref 80–100)
PHOSPHATE SERPL-MCNC: 7.1 MG/DL — HIGH (ref 2.5–4.5)
PLATELET # BLD AUTO: 256 K/UL — SIGNIFICANT CHANGE UP (ref 150–400)
POTASSIUM SERPL-MCNC: 4.3 MMOL/L — SIGNIFICANT CHANGE UP (ref 3.5–5.3)
POTASSIUM SERPL-SCNC: 4.3 MMOL/L — SIGNIFICANT CHANGE UP (ref 3.5–5.3)
PROTHROM AB SERPL-ACNC: 14.1 SEC — HIGH (ref 10–13.1)
RBC # BLD: 3.39 M/UL — LOW (ref 3.8–5.2)
RBC # FLD: 13.9 % — SIGNIFICANT CHANGE UP (ref 10.3–14.5)
SODIUM SERPL-SCNC: 134 MMOL/L — LOW (ref 135–145)
WBC # BLD: 8.43 K/UL — SIGNIFICANT CHANGE UP (ref 3.8–10.5)
WBC # FLD AUTO: 8.43 K/UL — SIGNIFICANT CHANGE UP (ref 3.8–10.5)

## 2019-08-12 PROCEDURE — 77001 FLUOROGUIDE FOR VEIN DEVICE: CPT | Mod: 26,59

## 2019-08-12 PROCEDURE — 99233 SBSQ HOSP IP/OBS HIGH 50: CPT

## 2019-08-12 PROCEDURE — 76937 US GUIDE VASCULAR ACCESS: CPT | Mod: 26

## 2019-08-12 PROCEDURE — 36905 THRMBC/NFS DIALYSIS CIRCUIT: CPT

## 2019-08-12 PROCEDURE — 36558 INSERT TUNNELED CV CATH: CPT | Mod: RT

## 2019-08-12 RX ADMIN — DOXERCALCIFEROL 2 MICROGRAM(S): 2.5 CAPSULE ORAL at 20:04

## 2019-08-12 NOTE — PROGRESS NOTE ADULT - SUBJECTIVE AND OBJECTIVE BOX
Vascular & Interventional Radiology Post-Procedure Note    Pre-Procedure Diagnosis: ESRD  Post-Procedure Diagnosis: Same as pre.  Indications for Procedure: Thrombosed AVG.    Attending: Jef  Resident: Dr. Barron    Procedure Details/Findings: De-clot of RUE AVG unsuccessful. Right tunnelled HD catheter placed.   Access (if applicable): Right EJ    Complications: none  Estimated Blood Loss: Minimal  Specimen: none  Sedation: Yes   Patient Condition/Disposition: Stable, Recovery x 1 hour. then to floor.     Plan:   - Catheter may be used immediately.  - Heparin drip may be restarted at 11pm tonight.

## 2019-08-12 NOTE — CHART NOTE - NSCHARTNOTEFT_GEN_A_CORE
Informed by RN that pt PTT was 123.1  Patient on heparin gtt 2/2 RUE AVG clot    No signs of bleeding at that time; nomogram followed by MARICHUY Duval [id number]

## 2019-08-12 NOTE — PROGRESS NOTE ADULT - SUBJECTIVE AND OBJECTIVE BOX
Patient is a 33y old  Female who presents with a chief complaint of Suspected Clotted AV Graft (12 Aug 2019 14:02)      SUBJECTIVE / OVERNIGHT EVENTS: feels ok, no cp, sob, chills,n/v    MEDICATIONS  (STANDING):  doxercalciferol Injectable 2 MICROGram(s) IV Push <User Schedule>  heparin  Infusion.  Unit(s)/Hr (15 mL/Hr) IV Continuous <Continuous>  sevelamer carbonate 800 milliGRAM(s) Oral three times a day with meals    MEDICATIONS  (PRN):  heparin  Injectable 6500 Unit(s) IV Push every 6 hours PRN For aPTT less than 40  heparin  Injectable 3000 Unit(s) IV Push every 6 hours PRN For aPTT between 40 - 57        CAPILLARY BLOOD GLUCOSE      POCT Blood Glucose.: 85 mg/dL (12 Aug 2019 12:18)    I&O's Summary    11 Aug 2019 07:01  -  12 Aug 2019 07:00  --------------------------------------------------------  IN: 1080 mL / OUT: 0 mL / NET: 1080 mL    12 Aug 2019 07:01  -  12 Aug 2019 15:28  --------------------------------------------------------  IN: 0 mL / OUT: 0 mL / NET: 0 mL        PHYSICAL EXAM:  GENERAL: NAD, well-developed  HEAD:  Atraumatic, Normocephalic  EYES: conjunctiva and sclera clear  NECK:  No JVD  CHEST/LUNG: Clear to auscultation bilaterally; No wheeze  HEART: Regular rate and rhythm; No murmurs, rubs, or gallops  ABDOMEN: Soft, Nontender, Nondistended; Bowel sounds present  EXTREMITIES:  2+ Peripheral Pulses, No clubbing, cyanosis, or edema, lue fistual  PSYCH: AAOx3      LABS:                        11.6   8.43  )-----------( 256      ( 12 Aug 2019 08:27 )             33.6     08-12    134<L>  |  86<L>  |  63<H>  ----------------------------<  85  4.3   |  27  |  15.45<H>    Ca    9.7      12 Aug 2019 06:53  Phos  7.1     08-12  Mg     2.4     08-12    TPro  9.1<H>  /  Alb  4.8  /  TBili  1.1  /  DBili  x   /  AST  12  /  ALT  7<L>  /  AlkPhos  85  08-10    PT/INR - ( 12 Aug 2019 10:11 )   PT: 14.1 sec;   INR: 1.23 ratio         PTT - ( 12 Aug 2019 10:11 )  PTT:123.2 sec          RADIOLOGY & ADDITIONAL TESTS:    Imaging Personally Reviewed:    Consultant(s) Notes Reviewed:      Care Discussed with Consultants/Other Providers:

## 2019-08-12 NOTE — PROGRESS NOTE ADULT - SUBJECTIVE AND OBJECTIVE BOX
Vascular & Interventional Radiology Pre-Procedure Note    Procedure Name: AV graft de-clot.     HPI: 33y Female with protein C deficiency on warfarin and thrombosed RUE AVG.    Allergies:   Medications (Abx/Cardiac/Anticoagulation/Blood Products)  heparin  Infusion.: 1500 Unit(s)/Hr IV Continuous (08-11 @ 09:25)  heparin  Infusion.: 1500 Unit(s)/Hr IV Continuous (08-10 @ 20:32)  warfarin: 5 milliGRAM(s) Oral (08-10 @ 23:04)    Data:    T(C): 36.7  HR: 74  BP: 114/74  RR: 18  SpO2: 98%    Exam  General: NAD  Chest: unlabored breathing.   Extremities: No thrill in RUE AVG    -WBC 8.43 / HgB 11.6 / Hct 33.6 / Plt 256  -Na 134 / Cl 86 / BUN 63 / Glucose 85  -K 4.3 / CO2 27 / Cr 15.45  -ALT -- / Alk Phos -- / T.Bili --  -INR1.23    Plan:   -33y Female presents for RUE AVG de-clot.   -Risks/Benefits/alternatives explained with the patient and witnessed informed consent obtained.

## 2019-08-12 NOTE — PROGRESS NOTE ADULT - SUBJECTIVE AND OBJECTIVE BOX
[facility] DIVISION OF KIDNEY DISEASES AND HYPERTENSION -- FOLLOW UP NOTE  --------------------------------------------------------------------------------  Chief Complaint: ESRD on HD    24 hour events/subjective: Pt. was seen and examined at bedside, feels well, denies any complains. Possible thrombectomy by IR today.     PAST HISTORY  --------------------------------------------------------------------------------  No significant changes to PMH, PSH, FHx, SHx, unless otherwise noted    ALLERGIES & MEDICATIONS  --------------------------------------------------------------------------------  Allergies    No Known Drug Allergies  shellfish (Angioedema)    Intolerances      Standing Inpatient Medications  doxercalciferol Injectable 2 MICROGram(s) IV Push <User Schedule>  heparin  Infusion.  Unit(s)/Hr IV Continuous <Continuous>  sevelamer carbonate 800 milliGRAM(s) Oral three times a day with meals    PRN Inpatient Medications  heparin  Injectable 6500 Unit(s) IV Push every 6 hours PRN  heparin  Injectable 3000 Unit(s) IV Push every 6 hours PRN      REVIEW OF SYSTEMS  --------------------------------------------------------------------------------  Gen: No fevers/chills  Skin: No rashes  Head/Eyes/Ears: Normal hearing,   Respiratory: No dyspnea, cough  CV: No chest pain  GI: No abdominal pain, diarrhea  : No dysuria, hematuria  MSK: No  edema  Heme: No easy bruising or bleeding  Psych: No significant depression      All other systems were reviewed and are negative, except as noted.    VITALS/PHYSICAL EXAM  --------------------------------------------------------------------------------  T(C): 36.9 (08-12-19 @ 04:41), Max: 37.1 (08-11-19 @ 11:20)  HR: 78 (08-12-19 @ 04:41) (76 - 82)  BP: 102/60 (08-12-19 @ 04:41) (94/66 - 110/76)  RR: 18 (08-12-19 @ 04:41) (18 - 18)  SpO2: 98% (08-12-19 @ 04:41) (97% - 98%)  Wt(kg): --  Height (cm): 149.86 (08-10-19 @ 16:15)  Weight (kg): 83.3 (08-10-19 @ 21:29)  BMI (kg/m2): 37.1 (08-10-19 @ 21:29)  BSA (m2): 1.78 (08-10-19 @ 21:29)      08-11-19 @ 07:01  -  08-12-19 @ 07:00  --------------------------------------------------------  IN: 1080 mL / OUT: 0 mL / NET: 1080 mL        Physical Exam:  	Gen: NAD, well-appearing  	HEENT: PERRL, supple neck, clear oropharynx  	Pulm: CTA B/L  	CV: RRR, S1S2; no rub  	Abd: +BS, soft, nontender/nondistended       	: No suprapubic tenderness  	LE: Warm,ntact strength; no edema  	Neuro: No focal deficits, AOX3,       	Vascular Access: AVG no bruit/thrill    LABS/STUDIES  --------------------------------------------------------------------------------              11.6   8.43  >-----------<  256      [08-12-19 @ 08:27]              33.6     134  |  86  |  63  ----------------------------<  85      [08-12-19 @ 06:53]  4.3   |  27  |  15.45        Ca     9.7     [08-12-19 @ 06:53]      Mg     2.4     [08-12-19 @ 06:53]      Phos  7.1     [08-12-19 @ 06:53]    TPro  9.1  /  Alb  4.8  /  TBili  1.1  /  DBili  x   /  AST  12  /  ALT  7   /  AlkPhos  85  [08-10-19 @ 18:26]    PT/INR: PT 13.5 , INR 1.17       [08-10-19 @ 18:26]  PTT: 80.9       [08-11-19 @ 07:30]      Creatinine Trend:  SCr 15.45 [08-12 @ 06:53]  SCr 12.72 [08-11 @ 07:32]  SCr 10.91 [08-10 @ 18:26]

## 2019-08-13 ENCOUNTER — TRANSCRIPTION ENCOUNTER (OUTPATIENT)
Age: 34
End: 2019-08-13

## 2019-08-13 LAB
ANION GAP SERPL CALC-SCNC: 15 MMOL/L — SIGNIFICANT CHANGE UP (ref 5–17)
APTT BLD: 69.8 SEC — HIGH (ref 27.5–36.3)
APTT BLD: 87.1 SEC — HIGH (ref 27.5–36.3)
BUN SERPL-MCNC: 35 MG/DL — HIGH (ref 7–23)
CALCIUM SERPL-MCNC: 10 MG/DL — SIGNIFICANT CHANGE UP (ref 8.4–10.5)
CHLORIDE SERPL-SCNC: 97 MMOL/L — SIGNIFICANT CHANGE UP (ref 96–108)
CO2 SERPL-SCNC: 25 MMOL/L — SIGNIFICANT CHANGE UP (ref 22–31)
CREAT SERPL-MCNC: 10.41 MG/DL — HIGH (ref 0.5–1.3)
GLUCOSE SERPL-MCNC: 88 MG/DL — SIGNIFICANT CHANGE UP (ref 70–99)
HBV DNA # SERPL NAA+PROBE: SIGNIFICANT CHANGE UP IU/ML
HBV DNA SERPL NAA+PROBE-LOG#: SIGNIFICANT CHANGE UP LOGIU/ML
HCT VFR BLD CALC: 36.2 % — SIGNIFICANT CHANGE UP (ref 34.5–45)
HGB BLD-MCNC: 11.8 G/DL — SIGNIFICANT CHANGE UP (ref 11.5–15.5)
INR BLD: 1.21 RATIO — HIGH (ref 0.88–1.16)
MAGNESIUM SERPL-MCNC: 2.2 MG/DL — SIGNIFICANT CHANGE UP (ref 1.6–2.6)
MCHC RBC-ENTMCNC: 32.6 GM/DL — SIGNIFICANT CHANGE UP (ref 32–36)
MCHC RBC-ENTMCNC: 32.7 PG — SIGNIFICANT CHANGE UP (ref 27–34)
MCV RBC AUTO: 100.3 FL — HIGH (ref 80–100)
PHOSPHATE SERPL-MCNC: 6.3 MG/DL — HIGH (ref 2.5–4.5)
PLATELET # BLD AUTO: 191 K/UL — SIGNIFICANT CHANGE UP (ref 150–400)
POTASSIUM SERPL-MCNC: 5 MMOL/L — SIGNIFICANT CHANGE UP (ref 3.5–5.3)
POTASSIUM SERPL-SCNC: 5 MMOL/L — SIGNIFICANT CHANGE UP (ref 3.5–5.3)
PROTHROM AB SERPL-ACNC: 13.9 SEC — HIGH (ref 10–12.9)
RBC # BLD: 3.61 M/UL — LOW (ref 3.8–5.2)
RBC # FLD: 13.8 % — SIGNIFICANT CHANGE UP (ref 10.3–14.5)
SODIUM SERPL-SCNC: 137 MMOL/L — SIGNIFICANT CHANGE UP (ref 135–145)
WBC # BLD: 7.1 K/UL — SIGNIFICANT CHANGE UP (ref 3.8–10.5)
WBC # FLD AUTO: 7.1 K/UL — SIGNIFICANT CHANGE UP (ref 3.8–10.5)

## 2019-08-13 PROCEDURE — 99233 SBSQ HOSP IP/OBS HIGH 50: CPT

## 2019-08-13 RX ORDER — HEPARIN SODIUM 5000 [USP'U]/ML
3000 INJECTION INTRAVENOUS; SUBCUTANEOUS EVERY 6 HOURS
Refills: 0 | Status: DISCONTINUED | OUTPATIENT
Start: 2019-08-13 | End: 2019-08-17

## 2019-08-13 RX ORDER — WARFARIN SODIUM 2.5 MG/1
7 TABLET ORAL ONCE
Refills: 0 | Status: COMPLETED | OUTPATIENT
Start: 2019-08-13 | End: 2019-08-13

## 2019-08-13 RX ORDER — HEPARIN SODIUM 5000 [USP'U]/ML
6500 INJECTION INTRAVENOUS; SUBCUTANEOUS EVERY 6 HOURS
Refills: 0 | Status: DISCONTINUED | OUTPATIENT
Start: 2019-08-13 | End: 2019-08-17

## 2019-08-13 RX ORDER — HEPARIN SODIUM 5000 [USP'U]/ML
INJECTION INTRAVENOUS; SUBCUTANEOUS
Qty: 25000 | Refills: 0 | Status: DISCONTINUED | OUTPATIENT
Start: 2019-08-13 | End: 2019-08-17

## 2019-08-13 RX ORDER — CHLORHEXIDINE GLUCONATE 213 G/1000ML
1 SOLUTION TOPICAL DAILY
Refills: 0 | Status: DISCONTINUED | OUTPATIENT
Start: 2019-08-13 | End: 2019-08-17

## 2019-08-13 RX ADMIN — HEPARIN SODIUM 1500 UNIT(S)/HR: 5000 INJECTION INTRAVENOUS; SUBCUTANEOUS at 22:13

## 2019-08-13 RX ADMIN — HEPARIN SODIUM 1500 UNIT(S)/HR: 5000 INJECTION INTRAVENOUS; SUBCUTANEOUS at 06:06

## 2019-08-13 RX ADMIN — SEVELAMER CARBONATE 800 MILLIGRAM(S): 2400 POWDER, FOR SUSPENSION ORAL at 12:29

## 2019-08-13 RX ADMIN — HEPARIN SODIUM 1500 UNIT(S)/HR: 5000 INJECTION INTRAVENOUS; SUBCUTANEOUS at 12:29

## 2019-08-13 RX ADMIN — SEVELAMER CARBONATE 800 MILLIGRAM(S): 2400 POWDER, FOR SUSPENSION ORAL at 17:18

## 2019-08-13 RX ADMIN — WARFARIN SODIUM 7 MILLIGRAM(S): 2.5 TABLET ORAL at 22:14

## 2019-08-13 RX ADMIN — SEVELAMER CARBONATE 800 MILLIGRAM(S): 2400 POWDER, FOR SUSPENSION ORAL at 09:16

## 2019-08-13 NOTE — DISCHARGE NOTE PROVIDER - NSDCCAREPROVSEEN_GEN_ALL_CORE_FT
Nohemi Sun  [facility] Medicine, Advance PracticeTeam  [facility] Surgery, Vascular  [facility], Team

## 2019-08-13 NOTE — PROGRESS NOTE ADULT - SUBJECTIVE AND OBJECTIVE BOX
Patient is a 33y old  Female who presents with a chief complaint of Suspected Clotted AV Graft (12 Aug 2019 18:19)      SUBJECTIVE / OVERNIGHT EVENTS: feels ok, no cp, sob, no bm today, some soreness around right tunneled catheter     MEDICATIONS  (STANDING):  doxercalciferol Injectable 2 MICROGram(s) IV Push <User Schedule>  heparin  Infusion.  Unit(s)/Hr (15 mL/Hr) IV Continuous <Continuous>  sevelamer carbonate 800 milliGRAM(s) Oral three times a day with meals    MEDICATIONS  (PRN):  heparin  Injectable 6500 Unit(s) IV Push every 6 hours PRN For aPTT less than 40  heparin  Injectable 3000 Unit(s) IV Push every 6 hours PRN For aPTT between 40 - 57        CAPILLARY BLOOD GLUCOSE      POCT Blood Glucose.: 85 mg/dL (12 Aug 2019 12:18)    I&O's Summary    12 Aug 2019 07:01  -  13 Aug 2019 07:00  --------------------------------------------------------  IN: 980 mL / OUT: 2300 mL / NET: -1320 mL        PHYSICAL EXAM:  GENERAL: NAD, well-developed  HEAD:  Atraumatic, Normocephalic  EYES: conjunctiva and sclera clear  NECK:  No JVD  CHEST/LUNG: Clear to auscultation bilaterally; No wheeze, right tunneled catheter   HEART: Regular rate and rhythm; S1S2  ABDOMEN: Soft, Nontender, Nondistended; Bowel sounds present  EXTREMITIES:  2+ Peripheral Pulses, No clubbing, cyanosis, or edema  PSYCH: AAOx3  NEUROLOGY: non-focal  SKIN: No rashes or lesions    LABS:                        11.8   7.10  )-----------( 191      ( 13 Aug 2019 09:00 )             36.2     08-13    137  |  97  |  35<H>  ----------------------------<  88  5.0   |  25  |  10.41<H>    Ca    10.0      13 Aug 2019 07:06  Phos  6.3     08-13  Mg     2.2     08-13      PT/INR - ( 12 Aug 2019 10:11 )   PT: 14.1 sec;   INR: 1.23 ratio         PTT - ( 12 Aug 2019 10:11 )  PTT:123.2 sec          RADIOLOGY & ADDITIONAL TESTS:    Imaging Personally Reviewed:    Consultant(s) Notes Reviewed:      Care Discussed with Consultants/Other Providers:

## 2019-08-13 NOTE — PROGRESS NOTE ADULT - SUBJECTIVE AND OBJECTIVE BOX
Interventional Radiology Follow- Up Note      This is a 33y Female s/p RUE AVG Fistulogram and RIJ permacath placement on 19 in Interventional Radiology with Dr. Fuchs.     Patient seen and examined @ bedside. No complaints offered. Denies pain at catheter site or RUE pain, swelling.     PAST MEDICAL & SURGICAL HISTORY:  ESRD on dialysis  Previous  section  History of right nephrectomy    Allergies: No Known Drug Allergies  shellfish (Angioedema)    LABS:                        11.8   7.10  )-----------( 191      ( 13 Aug 2019 09:00 )             36.2     08-13    137  |  97  |  35<H>  ----------------------------<  88  5.0   |  25  |  10.41<H>    Ca    10.0      13 Aug 2019 07:06  Phos  6.3     08-  Mg     2.2     -      PT/INR - ( 13 Aug 2019 11:51 )   PT: 13.9 sec;   INR: 1.21 ratio         PTT - ( 13 Aug 2019 11:51 )  PTT:69.8 sec    Vitals: T(F): 99.1 (19 @ 14:27), Max: 99.1 (19 @ 14:27)  HR: 81 (19 @ 14:27) (69 - 98)  BP: 97/66 (19 @ 14:27) (97/66 - 128/73)  RR: 18 (19 @ 14:27) (18 - 18)  SpO2: 97% (19 @ 14:27) (96% - 99%)    PHYSICAL EXAM:  General: Nontoxic, in NAD, A&O x3  RUE: No thrill over AVG, Purse string sutures removed and new dressing applied hemostasis achieved.     A/P: 33y Female with protein C deficiency, ESRD on HD who presented with thrombosed AVG now s/p fistulogram and permacath placement with unsuccessful de-clot on 19 in IR with Dr. Fuchs.   -Continue HD via permacath   -Continue global care per primary team  -Follow up with Dr. Mora   -A/c per primary team  -Please call IR at extension [phone] with any questions, concerns, or issues regarding above.

## 2019-08-13 NOTE — DISCHARGE NOTE PROVIDER - NSDCCPCAREPLAN_GEN_ALL_CORE_FT
PRINCIPAL DISCHARGE DIAGNOSIS  Diagnosis: Hemodialysis AV fistula thrombosis  Assessment and Plan of Treatment: Follow up with Dr. Mora in 1 week  Continue Coumadin   INR checks      SECONDARY DISCHARGE DIAGNOSES  Diagnosis: Hemodialysis patient  Assessment and Plan of Treatment: PRINCIPAL DISCHARGE DIAGNOSIS  Diagnosis: Hemodialysis AV fistula thrombosis  Assessment and Plan of Treatment: Follow up with Dr. Mora in 1 week  Continue Coumadin   INR checks      SECONDARY DISCHARGE DIAGNOSES  Diagnosis: Protein C deficiency  Assessment and Plan of Treatment: Coumadin as prescribed    Diagnosis: Hemodialysis patient  Assessment and Plan of Treatment: Dialysis M/W/F  Follow up with your Neprhologist PRINCIPAL DISCHARGE DIAGNOSIS  Diagnosis: Hemodialysis AV fistula thrombosis  Assessment and Plan of Treatment: Follow up with Dr. Mora in 1 week. Call for appointment.  Continue Coumadin as prescribed.      SECONDARY DISCHARGE DIAGNOSES  Diagnosis: Protein C deficiency  Assessment and Plan of Treatment: Continue with Coumadin as prescribed    Diagnosis: Hemodialysis patient  Assessment and Plan of Treatment: Follow up with your Neprhologist PRINCIPAL DISCHARGE DIAGNOSIS  Diagnosis: Hemodialysis AV fistula thrombosis  Assessment and Plan of Treatment: Follow up with Dr. Mora in 1 week. Call for appointment.  Continue Coumadin as prescribed.      SECONDARY DISCHARGE DIAGNOSES  Diagnosis: Protein C deficiency  Assessment and Plan of Treatment: Continue with Coumadin as prescribed. Today's PT/INR 26.3/ 2.25  You should have your INR checked on Monday or Tuesday as you received higher coumadin doses while in hospital. Your last 2 doses were 8mg.    Diagnosis: Hemodialysis patient  Assessment and Plan of Treatment: Follow up with your Neprhologist

## 2019-08-13 NOTE — DISCHARGE NOTE PROVIDER - CARE PROVIDER_API CALL
Jabier Mora)  Vascular Surgery  [address], [address]  [place] [address]  Phone: [phone]  Fax: [phone]  Follow Up Time:

## 2019-08-13 NOTE — DISCHARGE NOTE PROVIDER - HOSPITAL COURSE
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 Patient last fully dialyzed on 8/9/19 and has no complaints or lab abnormalities that would indicate need for urgent hemodialysis. RUE US showed thrombosis of graft. Vascular surgery saw patient and recommended heparin drip and IR consultation for revision. De-clot of RUE AVG unsuccessful per IR. Pt s/p right tunneled catheter placement. 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 Patient last fully dialyzed on 8/9/19 and has no complaints or lab abnormalities that would indicate need for urgent hemodialysis. RUE US showed thrombosis of graft. Vascular surgery saw patient and recommended heparin drip and IR consultation for revision. De-clot of RUE AVG unsuccessful per IR. Pt s/p right tunneled catheter placement.  Pt to f/u with Dr. Mora in 1 week. 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 Patient last fully dialyzed on 8/9/19 and has no complaints or lab abnormalities that would indicate need for urgent hemodialysis. RUE US showed thrombosis of graft. Vascular surgery saw patient and recommended heparin drip and IR consultation for revision. De-clot of RUE AVG unsuccessful per IR. Pt s/p right tunneled catheter placement.  Pt to f/u with Dr. Mora in 1 week.

## 2019-08-14 LAB
ANION GAP SERPL CALC-SCNC: 20 MMOL/L — HIGH (ref 5–17)
APTT BLD: 95.4 SEC — HIGH (ref 27.5–36.3)
BUN SERPL-MCNC: 47 MG/DL — HIGH (ref 7–23)
CALCIUM SERPL-MCNC: 9.8 MG/DL — SIGNIFICANT CHANGE UP (ref 8.4–10.5)
CHLORIDE SERPL-SCNC: 94 MMOL/L — LOW (ref 96–108)
CO2 SERPL-SCNC: 24 MMOL/L — SIGNIFICANT CHANGE UP (ref 22–31)
CREAT SERPL-MCNC: 13.43 MG/DL — HIGH (ref 0.5–1.3)
GLUCOSE SERPL-MCNC: 83 MG/DL — SIGNIFICANT CHANGE UP (ref 70–99)
HCT VFR BLD CALC: 32.4 % — LOW (ref 34.5–45)
HGB BLD-MCNC: 10.9 G/DL — LOW (ref 11.5–15.5)
INR BLD: 1.2 RATIO — HIGH (ref 0.88–1.16)
MCHC RBC-ENTMCNC: 33.6 GM/DL — SIGNIFICANT CHANGE UP (ref 32–36)
MCHC RBC-ENTMCNC: 33.9 PG — SIGNIFICANT CHANGE UP (ref 27–34)
MCV RBC AUTO: 100.6 FL — HIGH (ref 80–100)
PLATELET # BLD AUTO: 187 K/UL — SIGNIFICANT CHANGE UP (ref 150–400)
POTASSIUM SERPL-MCNC: 4.6 MMOL/L — SIGNIFICANT CHANGE UP (ref 3.5–5.3)
POTASSIUM SERPL-SCNC: 4.6 MMOL/L — SIGNIFICANT CHANGE UP (ref 3.5–5.3)
PROTHROM AB SERPL-ACNC: 13.9 SEC — HIGH (ref 10–13.1)
RBC # BLD: 3.22 M/UL — LOW (ref 3.8–5.2)
RBC # FLD: 14 % — SIGNIFICANT CHANGE UP (ref 10.3–14.5)
SODIUM SERPL-SCNC: 138 MMOL/L — SIGNIFICANT CHANGE UP (ref 135–145)
WBC # BLD: 7.63 K/UL — SIGNIFICANT CHANGE UP (ref 3.8–10.5)
WBC # FLD AUTO: 7.63 K/UL — SIGNIFICANT CHANGE UP (ref 3.8–10.5)

## 2019-08-14 PROCEDURE — 99232 SBSQ HOSP IP/OBS MODERATE 35: CPT | Mod: GC

## 2019-08-14 PROCEDURE — 99233 SBSQ HOSP IP/OBS HIGH 50: CPT

## 2019-08-14 RX ORDER — WARFARIN SODIUM 2.5 MG/1
7.5 TABLET ORAL ONCE
Refills: 0 | Status: COMPLETED | OUTPATIENT
Start: 2019-08-14 | End: 2019-08-14

## 2019-08-14 RX ADMIN — DOXERCALCIFEROL 2 MICROGRAM(S): 2.5 CAPSULE ORAL at 17:51

## 2019-08-14 RX ADMIN — SEVELAMER CARBONATE 800 MILLIGRAM(S): 2400 POWDER, FOR SUSPENSION ORAL at 08:27

## 2019-08-14 RX ADMIN — SEVELAMER CARBONATE 800 MILLIGRAM(S): 2400 POWDER, FOR SUSPENSION ORAL at 12:04

## 2019-08-14 RX ADMIN — HEPARIN SODIUM 1500 UNIT(S)/HR: 5000 INJECTION INTRAVENOUS; SUBCUTANEOUS at 09:30

## 2019-08-14 RX ADMIN — WARFARIN SODIUM 7.5 MILLIGRAM(S): 2.5 TABLET ORAL at 22:09

## 2019-08-14 RX ADMIN — CHLORHEXIDINE GLUCONATE 1 APPLICATION(S): 213 SOLUTION TOPICAL at 09:30

## 2019-08-14 NOTE — PROGRESS NOTE ADULT - SUBJECTIVE AND OBJECTIVE BOX
Patient is a 33y old  Female who presents with a chief complaint of Suspected Clotted AV Graft (13 Aug 2019 18:24)      INTERVAL HPI/OVERNIGHT EVENTS: JENNIE      Thrombosis due to vascular prosthetic devices, implants and grafts, initial encounter  FH: hypertension  FH: diabetes mellitus  Handoff  MEWS Score  ESRD on dialysis  Hemodialysis patient  Hemodialysis AV fistula thrombosis  Bone metabolism disorder  Anemia due to chronic kidney disease, on chronic dialysis  Obesity (BMI 35.0-39.9 without comorbidity)  Hypercalcemia  ESRD (end stage renal disease) on dialysis  AV graft thrombosis, initial encounter  Previous  section  History of right nephrectomy  CLOGGED DIALYSIS FISTULA  87  Hemodialysis patient      Review of Systems: 12 point review of systems otherwise negative  ( - )fevers/chills  ( - ) dyspnea  ( - ) cough  ( - ) chest pain  ( - ) palpatations  ( - ) dizziness/lightheadedness  ( - ) nausea/vomiting  ( - ) abd pain  ( - ) diarrhea  ( - ) melena  ( - ) hematochezia  ( - ) dysuria  ( - ) hematuria  ( - ) leg swelling  ( -) calf tenderness  ( - ) motor weakness  ( - ) extremity numbness  ( - ) back pain  ( + ) tolerating POs  ( + ) BM    MEDICATIONS  (STANDING):  chlorhexidine 2% Cloths 1 Application(s) Topical daily  doxercalciferol Injectable 2 MICROGram(s) IV Push <User Schedule>  heparin  Infusion.  Unit(s)/Hr (15 mL/Hr) IV Continuous <Continuous>  sevelamer carbonate 800 milliGRAM(s) Oral three times a day with meals    MEDICATIONS  (PRN):  heparin  Injectable 6500 Unit(s) IV Push every 6 hours PRN For aPTT less than 40  heparin  Injectable 3000 Unit(s) IV Push every 6 hours PRN For aPTT between 40 - 57      Allergies    No Known Drug Allergies  shellfish (Angioedema)    Intolerances          Vital Signs Last 24 Hrs  T(C): 36.5 (14 Aug 2019 06:32), Max: 37.3 (13 Aug 2019 14:27)  T(F): 97.7 (14 Aug 2019 06:32), Max: 99.1 (13 Aug 2019 14:27)  HR: 78 (14 Aug 2019 06:32) (78 - 93)  BP: 108/72 (14 Aug 2019 06:32) (97/66 - 122/83)  BP(mean): --  RR: 17 (14 Aug 2019 06:32) (17 - 18)  SpO2: 98% (14 Aug 2019 06:32) (95% - 98%)  CAPILLARY BLOOD GLUCOSE           @ 07:  -   @ 07:00  --------------------------------------------------------  IN: 1000 mL / OUT: 0 mL / NET: 1000 mL     @ 07: @ 11:26  --------------------------------------------------------  IN: 549 mL / OUT: 0 mL / NET: 549 mL        Physical Exam:    Daily     Daily   General:  NAD, non toxic  HEENT:  Nonicteric, PERRLA  CV:  RRR, no murmur  Lungs:  CTA B/L, no wheezes, rales, rhonchi  Abdomen:  Soft, non-tender, no distended, positive BS  Extremities:  no edema, AVF on right UE  Skin:  Warm and dry, no rashes  :  No ruiz  Neuro:  AAOx3, non-focal  No Restraints    LABS:                        10.9   7.63  )-----------( 187      ( 14 Aug 2019 08:42 )             32.4     08-14    138  |  94<L>  |  47<H>  ----------------------------<  83  4.6   |  24  |  13.43<H>    Ca    9.8      14 Aug 2019 07:05  Phos  6.3     08-13  Mg     2.2     08-13      PT/INR - ( 14 Aug 2019 08:48 )   PT: 13.9 sec;   INR: 1.20 ratio         PTT - ( 14 Aug 2019 08:48 )  PTT:95.4 sec        RADIOLOGY & ADDITIONAL TESTS:    ---------------------------------------------------------------------------  I personally reviewed: [  ]EKG   [  ]CXR    [  ] CT    [  ]Other  ---------------------------------------------------------------------------  PLEASE CHECK WHEN PRESENT:     [  ]Heart Failure     [  ] Acute     [  ] Acute on Chronic     [  ] Chronic  -------------------------------------------------------------------     [  ]Diastolic [HFpEF]     [  ]Systolic [HFrEF]     [  ]Combined [HFpEF & HFrEF]     [  ]Other:  -------------------------------------------------------------------  [  ]TAVON     [  ]ATN     [  ]Reneal Medullary Necrosis     [  ]Renal Cortical Necrosis     [  ]Other Pathological Lesions:    [  ]CKD 1  [  ]CKD 2  [  ]CKD 3  [  ]CKD 4  [  ]CKD 5  [  ]Other  -------------------------------------------------------------------  [  ]Other/Unspecified:    --------------------------------------------------------------------    Abdominal Nutritional Status  [  ]Malnutrition: See Nutrition Note  [  ]Cachexia  [  ]Other:   [  ]Supplement Ordered:  [  ]Morbid Obesity (BMI >=40]

## 2019-08-14 NOTE — PROGRESS NOTE ADULT - SUBJECTIVE AND OBJECTIVE BOX
[facility] DIVISION OF KIDNEY DISEASES AND HYPERTENSION -- FOLLOW UP NOTE  --------------------------------------------------------------------------------  Chief Complaint:    24 hour events/subjective:        PAST HISTORY  --------------------------------------------------------------------------------  No significant changes to PMH, PSH, FHx, SHx, unless otherwise noted    ALLERGIES & MEDICATIONS  --------------------------------------------------------------------------------  Allergies    No Known Drug Allergies  shellfish (Angioedema)    Intolerances      Standing Inpatient Medications  chlorhexidine 2% Cloths 1 Application(s) Topical daily  doxercalciferol Injectable 2 MICROGram(s) IV Push <User Schedule>  heparin  Infusion.  Unit(s)/Hr IV Continuous <Continuous>  sevelamer carbonate 800 milliGRAM(s) Oral three times a day with meals    PRN Inpatient Medications  heparin  Injectable 6500 Unit(s) IV Push every 6 hours PRN  heparin  Injectable 3000 Unit(s) IV Push every 6 hours PRN      REVIEW OF SYSTEMS  --------------------------------------------------------------------------------  Gen: No fevers/chills  Skin: No rashes  Head/Eyes/Ears: Normal hearing,   Respiratory: No dyspnea, cough  CV: No chest pain  GI: No abdominal pain, diarrhea  : No dysuria, hematuria  MSK: No  edema  Heme: No easy bruising or bleeding  Psych: No significant depression      All other systems were reviewed and are negative, except as noted.    VITALS/PHYSICAL EXAM  --------------------------------------------------------------------------------  T(C): 36.5 (08-14-19 @ 10:00), Max: 37.2 (08-13-19 @ 18:01)  HR: 79 (08-14-19 @ 10:00) (78 - 93)  BP: 130/81 (08-14-19 @ 10:00) (104/64 - 130/81)  RR: 18 (08-14-19 @ 10:00) (17 - 18)  SpO2: 97% (08-14-19 @ 10:00) (95% - 98%)  Wt(kg): --        08-13-19 @ 07:01  -  08-14-19 @ 07:00  --------------------------------------------------------  IN: 1000 mL / OUT: 0 mL / NET: 1000 mL    08-14-19 @ 07:01  -  08-14-19 @ 14:46  --------------------------------------------------------  IN: 789 mL / OUT: 0 mL / NET: 789 mL        Physical Exam:  	Gen: NAD  	HEENT: MMM  	Pulm: CTA B/L  	CV: S1S2  	Abd: Soft, +BS   	Ext: No LE edema B/L  	Neuro: Awake  	Skin: Warm and dry  	Vascular access:      LABS/STUDIES  --------------------------------------------------------------------------------              10.9   7.63  >-----------<  187      [08-14-19 @ 08:42]              32.4     138  |  94  |  47  ----------------------------<  83      [08-14-19 @ 07:05]  4.6   |  24  |  13.43        Ca     9.8     [08-14-19 @ 07:05]      Mg     2.2     [08-13-19 @ 07:06]      Phos  6.3     [08-13-19 @ 07:06]      PT/INR: PT 13.9 , INR 1.20       [08-14-19 @ 08:48]  PTT: 95.4       [08-14-19 @ 08:48]      Creatinine Trend:  SCr 13.43 [08-14 @ 07:05]  SCr 10.41 [08-13 @ 07:06]  SCr 15.45 [08-12 @ 06:53]  SCr 12.72 [08-11 @ 07:32]  SCr 10.91 [08-10 @ 18:26]        PTH -- (Ca 8.9)      [08-11-19 @ 12:16]   856  Vitamin D (25OH) 17.0      [08-11-19 @ 12:45]    HBsAb 118.2      [08-12-19 @ 09:58]  HBsAb Reactive      [08-12-19 @ 09:58]  HBsAg Nonreact      [08-11-19 @ 12:29]  HBcAb Nonreact      [08-11-19 @ 12:29] [facility] DIVISION OF KIDNEY DISEASES AND HYPERTENSION -- FOLLOW UP NOTE  --------------------------------------------------------------------------------  Chief Complaint: ESRD on HD    24 hour events/subjective: Pt. was seen and examined at bedside, feels well, denies any complains. Fail thrombectomy, had tunneled HD catheter placed. Plan for HD today.     PAST HISTORY  --------------------------------------------------------------------------------  No significant changes to PMH, PSH, FHx, SHx, unless otherwise noted    ALLERGIES & MEDICATIONS  --------------------------------------------------------------------------------  Allergies    No Known Drug Allergies  shellfish (Angioedema)    Intolerances      Standing Inpatient Medications  chlorhexidine 2% Cloths 1 Application(s) Topical daily  doxercalciferol Injectable 2 MICROGram(s) IV Push <User Schedule>  heparin  Infusion.  Unit(s)/Hr IV Continuous <Continuous>  sevelamer carbonate 800 milliGRAM(s) Oral three times a day with meals    PRN Inpatient Medications  heparin  Injectable 6500 Unit(s) IV Push every 6 hours PRN  heparin  Injectable 3000 Unit(s) IV Push every 6 hours PRN      REVIEW OF SYSTEMS  --------------------------------------------------------------------------------  Gen: No fevers/chills  Skin: No rashes  Head/Eyes/Ears: Normal hearing,   Respiratory: No dyspnea, cough  CV: No chest pain  GI: No abdominal pain, diarrhea  : No dysuria, hematuria  MSK: No  edema  Heme: No easy bruising or bleeding  Psych: No significant depression      All other systems were reviewed and are negative, except as noted.    VITALS/PHYSICAL EXAM  --------------------------------------------------------------------------------  T(C): 36.5 (08-14-19 @ 10:00), Max: 37.2 (08-13-19 @ 18:01)  HR: 79 (08-14-19 @ 10:00) (78 - 93)  BP: 130/81 (08-14-19 @ 10:00) (104/64 - 130/81)  RR: 18 (08-14-19 @ 10:00) (17 - 18)  SpO2: 97% (08-14-19 @ 10:00) (95% - 98%)  Wt(kg): --        08-13-19 @ 07:01  -  08-14-19 @ 07:00  --------------------------------------------------------  IN: 1000 mL / OUT: 0 mL / NET: 1000 mL    08-14-19 @ 07:01  -  08-14-19 @ 14:46  --------------------------------------------------------  IN: 789 mL / OUT: 0 mL / NET: 789 mL        Physical Exam:  	Gen: NAD, well-appearing  	HEENT: PERRL, supple neck, clear oropharynx  	Pulm: CTA B/L  	CV: RRR, S1S2; no rub  	Abd: +BS, soft, nontender/nondistended       	: No suprapubic tenderness  	LE: Warm,ntact strength; no edema  	Neuro: No focal deficits, AOX3,       	Vascular Access: AVG no bruit/thrill. RIJ tunneled HD catheter placed.       LABS/STUDIES  --------------------------------------------------------------------------------              10.9   7.63  >-----------<  187      [08-14-19 @ 08:42]              32.4     138  |  94  |  47  ----------------------------<  83      [08-14-19 @ 07:05]  4.6   |  24  |  13.43        Ca     9.8     [08-14-19 @ 07:05]      Mg     2.2     [08-13-19 @ 07:06]      Phos  6.3     [08-13-19 @ 07:06]      PT/INR: PT 13.9 , INR 1.20       [08-14-19 @ 08:48]  PTT: 95.4       [08-14-19 @ 08:48]      Creatinine Trend:  SCr 13.43 [08-14 @ 07:05]  SCr 10.41 [08-13 @ 07:06]  SCr 15.45 [08-12 @ 06:53]  SCr 12.72 [08-11 @ 07:32]  SCr 10.91 [08-10 @ 18:26]        PTH -- (Ca 8.9)      [08-11-19 @ 12:16]   856  Vitamin D (25OH) 17.0      [08-11-19 @ 12:45]    HBsAb 118.2      [08-12-19 @ 09:58]  HBsAb Reactive      [08-12-19 @ 09:58]  HBsAg Nonreact      [08-11-19 @ 12:29]  HBcAb Nonreact      [08-11-19 @ 12:29]

## 2019-08-15 LAB
ANION GAP SERPL CALC-SCNC: 15 MMOL/L — SIGNIFICANT CHANGE UP (ref 5–17)
APTT BLD: 101.9 SEC — HIGH (ref 27.5–36.3)
APTT BLD: 109.3 SEC — HIGH (ref 27.5–36.3)
BUN SERPL-MCNC: 23 MG/DL — SIGNIFICANT CHANGE UP (ref 7–23)
CALCIUM SERPL-MCNC: 9.4 MG/DL — SIGNIFICANT CHANGE UP (ref 8.4–10.5)
CHLORIDE SERPL-SCNC: 94 MMOL/L — LOW (ref 96–108)
CO2 SERPL-SCNC: 25 MMOL/L — SIGNIFICANT CHANGE UP (ref 22–31)
CREAT SERPL-MCNC: 8.76 MG/DL — HIGH (ref 0.5–1.3)
GLUCOSE SERPL-MCNC: 94 MG/DL — SIGNIFICANT CHANGE UP (ref 70–99)
HCT VFR BLD CALC: 33 % — LOW (ref 34.5–45)
HGB BLD-MCNC: 11.1 G/DL — LOW (ref 11.5–15.5)
INR BLD: 1.48 RATIO — HIGH (ref 0.88–1.16)
MCHC RBC-ENTMCNC: 33.5 PG — SIGNIFICANT CHANGE UP (ref 27–34)
MCHC RBC-ENTMCNC: 33.6 GM/DL — SIGNIFICANT CHANGE UP (ref 32–36)
MCV RBC AUTO: 99.7 FL — SIGNIFICANT CHANGE UP (ref 80–100)
PLATELET # BLD AUTO: 164 K/UL — SIGNIFICANT CHANGE UP (ref 150–400)
POTASSIUM SERPL-MCNC: 4.2 MMOL/L — SIGNIFICANT CHANGE UP (ref 3.5–5.3)
POTASSIUM SERPL-SCNC: 4.2 MMOL/L — SIGNIFICANT CHANGE UP (ref 3.5–5.3)
PROTHROM AB SERPL-ACNC: 17.1 SEC — HIGH (ref 10–13.1)
RBC # BLD: 3.31 M/UL — LOW (ref 3.8–5.2)
RBC # FLD: 13.7 % — SIGNIFICANT CHANGE UP (ref 10.3–14.5)
SODIUM SERPL-SCNC: 134 MMOL/L — LOW (ref 135–145)
WBC # BLD: 7.67 K/UL — SIGNIFICANT CHANGE UP (ref 3.8–10.5)
WBC # FLD AUTO: 7.67 K/UL — SIGNIFICANT CHANGE UP (ref 3.8–10.5)

## 2019-08-15 PROCEDURE — 99233 SBSQ HOSP IP/OBS HIGH 50: CPT

## 2019-08-15 RX ORDER — WARFARIN SODIUM 2.5 MG/1
8 TABLET ORAL ONCE
Refills: 0 | Status: COMPLETED | OUTPATIENT
Start: 2019-08-15 | End: 2019-08-15

## 2019-08-15 RX ADMIN — SEVELAMER CARBONATE 800 MILLIGRAM(S): 2400 POWDER, FOR SUSPENSION ORAL at 09:02

## 2019-08-15 RX ADMIN — SEVELAMER CARBONATE 800 MILLIGRAM(S): 2400 POWDER, FOR SUSPENSION ORAL at 17:37

## 2019-08-15 RX ADMIN — HEPARIN SODIUM 1300 UNIT(S)/HR: 5000 INJECTION INTRAVENOUS; SUBCUTANEOUS at 10:14

## 2019-08-15 RX ADMIN — WARFARIN SODIUM 8 MILLIGRAM(S): 2.5 TABLET ORAL at 22:08

## 2019-08-15 RX ADMIN — CHLORHEXIDINE GLUCONATE 1 APPLICATION(S): 213 SOLUTION TOPICAL at 09:16

## 2019-08-15 RX ADMIN — HEPARIN SODIUM 1100 UNIT(S)/HR: 5000 INJECTION INTRAVENOUS; SUBCUTANEOUS at 17:03

## 2019-08-15 RX ADMIN — SEVELAMER CARBONATE 800 MILLIGRAM(S): 2400 POWDER, FOR SUSPENSION ORAL at 13:04

## 2019-08-15 NOTE — PROGRESS NOTE ADULT - SUBJECTIVE AND OBJECTIVE BOX
Patient ready for discharge - vascular surgery originally consulted for clotted AVF.  During hospital course patient was seen by IR and had an unsuccessful thrombosis of AVF, and tunneled catheter was placed by IR.   The patient can follow up with Dr. Mora as out patient 1-2 weeks following discharge.       Vascular Surgery   p90

## 2019-08-15 NOTE — PROGRESS NOTE ADULT - SUBJECTIVE AND OBJECTIVE BOX
Patient is a 33y old  Female who presents with a chief complaint of Suspected Clotted AV Graft (14 Aug 2019 14:46)      SUBJECTIVE / OVERNIGHT EVENTS: feels better, no pain, cp, sob, abdominal pain, chills     MEDICATIONS  (STANDING):  chlorhexidine 2% Cloths 1 Application(s) Topical daily  doxercalciferol Injectable 2 MICROGram(s) IV Push <User Schedule>  heparin  Infusion.  Unit(s)/Hr (15 mL/Hr) IV Continuous <Continuous>  sevelamer carbonate 800 milliGRAM(s) Oral three times a day with meals  warfarin 8 milliGRAM(s) Oral once    MEDICATIONS  (PRN):  heparin  Injectable 6500 Unit(s) IV Push every 6 hours PRN For aPTT less than 40  heparin  Injectable 3000 Unit(s) IV Push every 6 hours PRN For aPTT between 40 - 57        CAPILLARY BLOOD GLUCOSE        I&O's Summary    14 Aug 2019 07:01  -  15 Aug 2019 07:00  --------------------------------------------------------  IN: 1809 mL / OUT: 2100 mL / NET: -291 mL        PHYSICAL EXAM:  GENERAL: NAD, well-developed, obese   HEAD:  Atraumatic, Normocephalic  EYES: conjunctiva and sclera clear  NECK:  No JVD  CHEST/LUNG: Clear to auscultation bilaterally; No wheeze, right tunneled catheter  HEART: Regular rate and rhythm; S1S2  ABDOMEN: Soft, Nontender, Nondistended; Bowel sounds present  EXTREMITIES:  2+ Peripheral Pulses, No clubbing, cyanosis, or edema  PSYCH: AAOx3  NEUROLOGY: non-focal  SKIN: No rashes or lesions    LABS:                        11.1   7.67  )-----------( 164      ( 15 Aug 2019 08:08 )             33.0     08-15    134<L>  |  94<L>  |  23  ----------------------------<  94  4.2   |  25  |  8.76<H>    Ca    9.4      15 Aug 2019 06:26      PT/INR - ( 15 Aug 2019 09:33 )   PT: 17.1 sec;   INR: 1.48 ratio         PTT - ( 15 Aug 2019 09:33 )  PTT:109.3 sec          RADIOLOGY & ADDITIONAL TESTS:    Imaging Personally Reviewed:    Consultant(s) Notes Reviewed:      Care Discussed with Consultants/Other Providers:

## 2019-08-16 LAB
ANION GAP SERPL CALC-SCNC: 17 MMOL/L — SIGNIFICANT CHANGE UP (ref 5–17)
APTT BLD: 59.1 SEC — HIGH (ref 27.5–36.3)
APTT BLD: 68.3 SEC — HIGH (ref 27.5–36.3)
BUN SERPL-MCNC: 36 MG/DL — HIGH (ref 7–23)
CALCIUM SERPL-MCNC: 9.9 MG/DL — SIGNIFICANT CHANGE UP (ref 8.4–10.5)
CHLORIDE SERPL-SCNC: 92 MMOL/L — LOW (ref 96–108)
CO2 SERPL-SCNC: 26 MMOL/L — SIGNIFICANT CHANGE UP (ref 22–31)
CREAT SERPL-MCNC: 11.84 MG/DL — HIGH (ref 0.5–1.3)
GLUCOSE SERPL-MCNC: 96 MG/DL — SIGNIFICANT CHANGE UP (ref 70–99)
HCT VFR BLD CALC: 32.2 % — LOW (ref 34.5–45)
HGB BLD-MCNC: 11.1 G/DL — LOW (ref 11.5–15.5)
INR BLD: 1.73 RATIO — HIGH (ref 0.88–1.16)
MCHC RBC-ENTMCNC: 34.4 PG — HIGH (ref 27–34)
MCHC RBC-ENTMCNC: 34.5 GM/DL — SIGNIFICANT CHANGE UP (ref 32–36)
MCV RBC AUTO: 99.7 FL — SIGNIFICANT CHANGE UP (ref 80–100)
PLATELET # BLD AUTO: 166 K/UL — SIGNIFICANT CHANGE UP (ref 150–400)
POTASSIUM SERPL-MCNC: 4.5 MMOL/L — SIGNIFICANT CHANGE UP (ref 3.5–5.3)
POTASSIUM SERPL-SCNC: 4.5 MMOL/L — SIGNIFICANT CHANGE UP (ref 3.5–5.3)
PROTHROM AB SERPL-ACNC: 19.9 SEC — HIGH (ref 10–13.1)
RBC # BLD: 3.23 M/UL — LOW (ref 3.8–5.2)
RBC # FLD: 13.2 % — SIGNIFICANT CHANGE UP (ref 10.3–14.5)
SODIUM SERPL-SCNC: 134 MMOL/L — LOW (ref 135–145)
WBC # BLD: 6.65 K/UL — SIGNIFICANT CHANGE UP (ref 3.8–10.5)
WBC # FLD AUTO: 6.65 K/UL — SIGNIFICANT CHANGE UP (ref 3.8–10.5)

## 2019-08-16 PROCEDURE — 99233 SBSQ HOSP IP/OBS HIGH 50: CPT

## 2019-08-16 PROCEDURE — 99232 SBSQ HOSP IP/OBS MODERATE 35: CPT | Mod: GC

## 2019-08-16 RX ORDER — WARFARIN SODIUM 2.5 MG/1
8 TABLET ORAL ONCE
Refills: 0 | Status: COMPLETED | OUTPATIENT
Start: 2019-08-16 | End: 2019-08-16

## 2019-08-16 RX ADMIN — WARFARIN SODIUM 8 MILLIGRAM(S): 2.5 TABLET ORAL at 21:59

## 2019-08-16 RX ADMIN — SEVELAMER CARBONATE 800 MILLIGRAM(S): 2400 POWDER, FOR SUSPENSION ORAL at 08:02

## 2019-08-16 RX ADMIN — HEPARIN SODIUM 1100 UNIT(S)/HR: 5000 INJECTION INTRAVENOUS; SUBCUTANEOUS at 07:59

## 2019-08-16 RX ADMIN — SEVELAMER CARBONATE 800 MILLIGRAM(S): 2400 POWDER, FOR SUSPENSION ORAL at 13:21

## 2019-08-16 RX ADMIN — DOXERCALCIFEROL 2 MICROGRAM(S): 2.5 CAPSULE ORAL at 10:54

## 2019-08-16 RX ADMIN — HEPARIN SODIUM 1100 UNIT(S)/HR: 5000 INJECTION INTRAVENOUS; SUBCUTANEOUS at 00:40

## 2019-08-16 RX ADMIN — CHLORHEXIDINE GLUCONATE 1 APPLICATION(S): 213 SOLUTION TOPICAL at 13:21

## 2019-08-16 RX ADMIN — SEVELAMER CARBONATE 800 MILLIGRAM(S): 2400 POWDER, FOR SUSPENSION ORAL at 17:28

## 2019-08-16 NOTE — PROGRESS NOTE ADULT - SUBJECTIVE AND OBJECTIVE BOX
[facility] DIVISION OF KIDNEY DISEASES AND HYPERTENSION -- FOLLOW UP NOTE  --------------------------------------------------------------------------------  Chief Complaint: ESRD on HD    24 hour events/subjective: Pt. was seen and examined at bedside, feels well, denies any complains. Plan for HD today.     PAST HISTORY  --------------------------------------------------------------------------------  No significant changes to PMH, PSH, FHx, SHx, unless otherwise noted    ALLERGIES & MEDICATIONS  --------------------------------------------------------------------------------  Allergies    No Known Drug Allergies  shellfish (Angioedema)    Intolerances      Standing Inpatient Medications  chlorhexidine 2% Cloths 1 Application(s) Topical daily  doxercalciferol Injectable 2 MICROGram(s) IV Push <User Schedule>  heparin  Infusion.  Unit(s)/Hr IV Continuous <Continuous>  sevelamer carbonate 800 milliGRAM(s) Oral three times a day with meals    PRN Inpatient Medications  heparin  Injectable 6500 Unit(s) IV Push every 6 hours PRN  heparin  Injectable 3000 Unit(s) IV Push every 6 hours PRN      REVIEW OF SYSTEMS  --------------------------------------------------------------------------------  Gen: No fevers/chills  Skin: No rashes  Head/Eyes/Ears: Normal hearing,   Respiratory: No dyspnea, cough  CV: No chest pain  GI: No abdominal pain, diarrhea  : No dysuria, hematuria  MSK: No  edema  Heme: No easy bruising or bleeding  Psych: No significant depression      All other systems were reviewed and are negative, except as noted.    VITALS/PHYSICAL EXAM  --------------------------------------------------------------------------------  T(C): 36.9 (08-16-19 @ 09:27), Max: 37.1 (08-15-19 @ 11:43)  HR: 75 (08-16-19 @ 09:27) (75 - 81)  BP: 122/63 (08-16-19 @ 09:27) (91/57 - 122/63)  RR: 18 (08-16-19 @ 09:27) (18 - 18)  SpO2: 97% (08-16-19 @ 09:27) (95% - 97%)  Wt(kg): --        08-15-19 @ 07:01  -  08-16-19 @ 07:00  --------------------------------------------------------  IN: 1081 mL / OUT: 0 mL / NET: 1081 mL        Physical Exam:  	Gen: NAD  	HEENT: MMM  	Pulm: CTA B/L  	CV: RRR, S1S2; no rub  	Abd: +BS, soft, nontender/nondistended       	: No suprapubic tenderness  	LE: no edema  	Neuro: No focal deficits, AOX3,       	Vascular Access: AVG no bruit/thrill. RIJ tunneled HD catheter placed.     LABS/STUDIES  --------------------------------------------------------------------------------              11.1   6.65  >-----------<  166      [08-16-19 @ 08:41]              32.2     134  |  92  |  36  ----------------------------<  96      [08-16-19 @ 06:43]  4.5   |  26  |  11.84        Ca     9.9     [08-16-19 @ 06:43]      PT/INR: PT 19.9 , INR 1.73       [08-16-19 @ 08:03]  PTT: 59.1       [08-16-19 @ 06:43]      Creatinine Trend:  SCr 11.84 [08-16 @ 06:43]  SCr 8.76 [08-15 @ 06:26]  SCr 13.43 [08-14 @ 07:05]  SCr 10.41 [08-13 @ 07:06]  SCr 15.45 [08-12 @ 06:53]

## 2019-08-16 NOTE — PROGRESS NOTE ADULT - ATTENDING COMMENTS
Elisa Elliott DO  [facility]ist  [phone]
AVG  Had thrombvosis Friday  Last HD Friday  stable  L clear no edema  IR today and HD
Nancy Betts MD  Off: [phone]  Cell: [phone]
HD TIW (MWF).   AVG malfunctioning.  Plan for HD today.   vascular surgery follow up for long-term AVG plan.   orders written
- if inr >1.9-2 in am, discharge home

## 2019-08-16 NOTE — PROGRESS NOTE ADULT - SUBJECTIVE AND OBJECTIVE BOX
Patient is a 33y old  Female who presents with a chief complaint of Suspected Clotted AV Graft (16 Aug 2019 10:19)      SUBJECTIVE / OVERNIGHT EVENTS: feels better, had bm, no cp, sob, chills    MEDICATIONS  (STANDING):  chlorhexidine 2% Cloths 1 Application(s) Topical daily  doxercalciferol Injectable 2 MICROGram(s) IV Push <User Schedule>  heparin  Infusion.  Unit(s)/Hr (15 mL/Hr) IV Continuous <Continuous>  sevelamer carbonate 800 milliGRAM(s) Oral three times a day with meals  warfarin 8 milliGRAM(s) Oral once    MEDICATIONS  (PRN):  heparin  Injectable 6500 Unit(s) IV Push every 6 hours PRN For aPTT less than 40  heparin  Injectable 3000 Unit(s) IV Push every 6 hours PRN For aPTT between 40 - 57        CAPILLARY BLOOD GLUCOSE        I&O's Summary    15 Aug 2019 07:01  -  16 Aug 2019 07:00  --------------------------------------------------------  IN: 1081 mL / OUT: 0 mL / NET: 1081 mL        PHYSICAL EXAM:  GENERAL: NAD, well-developed  HEAD:  Atraumatic, Normocephalic  EYES: conjunctiva and sclera clear  NECK: No JVD  CHEST/LUNG: Clear to auscultation bilaterally; No wheeze, right tunneled catheter, dressing c/d/i  HEART: Regular rate and rhythm; S1S2  ABDOMEN: Soft, Nontender, Nondistended; Bowel sounds present  EXTREMITIES:  No clubbing, cyanosis, or edema  PSYCH: AAOx3      LABS:                        11.1   6.65  )-----------( 166      ( 16 Aug 2019 08:41 )             32.2     08-16    134<L>  |  92<L>  |  36<H>  ----------------------------<  96  4.5   |  26  |  11.84<H>    Ca    9.9      16 Aug 2019 06:43      PT/INR - ( 16 Aug 2019 08:03 )   PT: 19.9 sec;   INR: 1.73 ratio         PTT - ( 16 Aug 2019 06:43 )  PTT:59.1 sec          RADIOLOGY & ADDITIONAL TESTS:    Imaging Personally Reviewed:    Consultant(s) Notes Reviewed:      Care Discussed with Consultants/Other Providers:

## 2019-08-17 ENCOUNTER — TRANSCRIPTION ENCOUNTER (OUTPATIENT)
Age: 34
End: 2019-08-17

## 2019-08-17 VITALS
HEART RATE: 74 BPM | DIASTOLIC BLOOD PRESSURE: 72 MMHG | TEMPERATURE: 98 F | RESPIRATION RATE: 18 BRPM | SYSTOLIC BLOOD PRESSURE: 110 MMHG | OXYGEN SATURATION: 97 %

## 2019-08-17 LAB
ANION GAP SERPL CALC-SCNC: 15 MMOL/L — SIGNIFICANT CHANGE UP (ref 5–17)
APTT BLD: 75.7 SEC — HIGH (ref 27.5–36.3)
BUN SERPL-MCNC: 30 MG/DL — HIGH (ref 7–23)
CALCIUM SERPL-MCNC: 10.1 MG/DL — SIGNIFICANT CHANGE UP (ref 8.4–10.5)
CHLORIDE SERPL-SCNC: 92 MMOL/L — LOW (ref 96–108)
CO2 SERPL-SCNC: 26 MMOL/L — SIGNIFICANT CHANGE UP (ref 22–31)
CREAT SERPL-MCNC: 8.94 MG/DL — HIGH (ref 0.5–1.3)
GLUCOSE SERPL-MCNC: 82 MG/DL — SIGNIFICANT CHANGE UP (ref 70–99)
HCT VFR BLD CALC: 34.4 % — LOW (ref 34.5–45)
HGB BLD-MCNC: 11.3 G/DL — LOW (ref 11.5–15.5)
INR BLD: 2.25 RATIO — HIGH (ref 0.88–1.16)
MCHC RBC-ENTMCNC: 32.7 PG — SIGNIFICANT CHANGE UP (ref 27–34)
MCHC RBC-ENTMCNC: 32.8 GM/DL — SIGNIFICANT CHANGE UP (ref 32–36)
MCV RBC AUTO: 99.4 FL — SIGNIFICANT CHANGE UP (ref 80–100)
PLATELET # BLD AUTO: 164 K/UL — SIGNIFICANT CHANGE UP (ref 150–400)
POTASSIUM SERPL-MCNC: 4.4 MMOL/L — SIGNIFICANT CHANGE UP (ref 3.5–5.3)
POTASSIUM SERPL-SCNC: 4.4 MMOL/L — SIGNIFICANT CHANGE UP (ref 3.5–5.3)
PROTHROM AB SERPL-ACNC: 26.3 SEC — HIGH (ref 10–13.1)
RBC # BLD: 3.46 M/UL — LOW (ref 3.8–5.2)
RBC # FLD: 13.2 % — SIGNIFICANT CHANGE UP (ref 10.3–14.5)
SODIUM SERPL-SCNC: 133 MMOL/L — LOW (ref 135–145)
WBC # BLD: 7.3 K/UL — SIGNIFICANT CHANGE UP (ref 3.8–10.5)
WBC # FLD AUTO: 7.3 K/UL — SIGNIFICANT CHANGE UP (ref 3.8–10.5)

## 2019-08-17 PROCEDURE — 99239 HOSP IP/OBS DSCHRG MGMT >30: CPT

## 2019-08-17 RX ADMIN — SEVELAMER CARBONATE 800 MILLIGRAM(S): 2400 POWDER, FOR SUSPENSION ORAL at 08:36

## 2019-08-17 RX ADMIN — SEVELAMER CARBONATE 800 MILLIGRAM(S): 2400 POWDER, FOR SUSPENSION ORAL at 12:22

## 2019-08-17 RX ADMIN — CHLORHEXIDINE GLUCONATE 1 APPLICATION(S): 213 SOLUTION TOPICAL at 12:22

## 2019-08-17 RX ADMIN — HEPARIN SODIUM 1100 UNIT(S)/HR: 5000 INJECTION INTRAVENOUS; SUBCUTANEOUS at 12:21

## 2019-08-17 NOTE — DISCHARGE NOTE NURSING/CASE MANAGEMENT/SOCIAL WORK - NSDCDPATPORTLINK_GEN_ALL_CORE
You can access the How do you roll?[facility] Patient Portal, offered by [facility], by registering with the following website: [email or web address]

## 2019-08-17 NOTE — PROGRESS NOTE ADULT - SUBJECTIVE AND OBJECTIVE BOX
SUBJECTIVE:    No acute events overnight, afebrile, hds.  No cp, sob, n/v, abd pn.    VITAL SIGNS:    Vital Signs Last 24 Hrs  T(C): 36.9 (17 Aug 2019 13:13), Max: 36.9 (17 Aug 2019 13:13)  T(F): 98.5 (17 Aug 2019 13:13), Max: 98.5 (17 Aug 2019 13:13)  HR: 74 (17 Aug 2019 13:13) (74 - 99)  BP: 110/72 (17 Aug 2019 13:13) (102/70 - 117/76)  BP(mean): --  RR: 18 (17 Aug 2019 13:13) (18 - 18)  SpO2: 97% (17 Aug 2019 13:13) (97% - 99%)    PHYSICAL EXAM:     GENERAL: no acute distress  HEENT: NC/AT, EOMI, neck supple, MMM  RESPIRATORY: LCTAB/L, no rhonchi, rales, or wheezing  CARDIOVASCULAR: RRR, no murmurs, gallops, rubs  ABDOMINAL: soft, non-tender, non-distended, positive bowel sounds   EXTREMITIES: no clubbing, cyanosis, or edema, right AV graft  NEUROLOGICAL: alert and oriented x 3, non-focal  SKIN: no rashes or lesions   MUSCULOSKELETAL: no gross joint deformity                          11.3   7.30  )-----------( 164      ( 17 Aug 2019 10:23 )             34.4     08-17    133<L>  |  92<L>  |  30<H>  ----------------------------<  82  4.4   |  26  |  8.94<H>    Ca    10.1      17 Aug 2019 07:14        CAPILLARY BLOOD GLUCOSE          MEDICATIONS  (STANDING):  chlorhexidine 2% Cloths 1 Application(s) Topical daily  doxercalciferol Injectable 2 MICROGram(s) IV Push <User Schedule>  sevelamer carbonate 800 milliGRAM(s) Oral three times a day with meals

## 2019-08-17 NOTE — PROGRESS NOTE ADULT - ASSESSMENT
33F with PMHx of right nephrectomy when she was a toddler and ESRD (M/W/F for approximately eight years via right AV Graft) presents to [facility] after suspected graft thrombosis. Patient last fully dialyzed on 8/9/19 and has no complaints or lab abnormalities that would indicate need for urgent hemodialysis. RUE US showed thrombosis of graft. Vascular surgery saw patient and recommended heparin drip and IR consultation for revision.
33F with PMHx of right nephrectomy when she was a toddler and ESRD (M/W/F for approximately eight years via right AV Graft) presents to [facility] after suspected graft thrombosis. Patient last fully dialyzed on 8/9/19 and has no complaints or lab abnormalities that would indicate need for urgent hemodialysis. RUE US showed thrombosis of graft. Vascular surgery saw patient and recommended heparin drip and IR consultation for revision. Pt s/p right tunneled catheter placement
33F with PMHx of right nephrectomy when she was a toddler and ESRD (M/W/F for approximately eight years via right AV Graft) presents to [facility] after suspected graft thrombosis. Patient last fully dialyzed on 8/9/19 and has no complaints or lab abnormalities that would indicate need for urgent hemodialysis. RUE US showed thrombosis of graft. Vascular surgery saw patient and recommended heparin drip and IR consultation for revision. Pt s/p right tunneled catheter placement.
33F with PMHx of right nephrectomy when she was a toddler and ESRD (M/W/F for approximately eight years via right AV Graft) presents to [facility] after suspected graft thrombosis. Patient last fully dialyzed on 8/9/19 and has no complaints or lab abnormalities that would indicate need for urgent hemodialysis. RUE US showed thrombosis of graft. Vascular surgery saw patient and recommended heparin drip and IR consultation for revision. Pt s/p right tunneled catheter placement.
33F with PMHx of right nephrectomy when she was a toddler and ESRD (M/W/F for approximately eight years via right AV Graft) presents to [facility] after suspected graft thrombosis. Patient last fully dialyzed on 8/9/19 and has no complaints or lab abnormalities that would indicate need for urgent hemodialysis. RUE US showed thrombosis of graft. Vascular surgery saw patient and recommended heparin drip and IR consultation for revision. Pt s/p right tunneled catheter placement.
Patient is a 33y old F ESRD (M/W/F for approximately eight years via right AV Graft), on coumadin for hypercoagulable state presents to [facility] after suspected graft thrombosis, Nephrology consulted for ESRD on HD management.
Patient is a 33y old F ESRD (M/W/F for approximately eight years via right AV Graft), on coumadin for hypercoagulable state presents to [facility] after suspected graft thrombosis, Nephrology consulted for ESRD on HD management.
33F with PMHx of right nephrectomy when she was a toddler and ESRD (M/W/F for approximately eight years via right AV Graft) presents to [facility] after suspected graft thrombosis. Patient last fully dialyzed on 8/9/19 and has no complaints or lab abnormalities that would indicate need for urgent hemodialysis. RUE US showed thrombosis of graft. Vascular surgery saw patient and recommended heparin drip and IR consultation for revision.
Patient is a 33y old F ESRD (M/W/F for approximately eight years via right AV Graft), on coumadin for hypercoagulable state presents to [facility] after suspected graft thrombosis, Nephrology consulted for ESRD on HD management.
33F with PMHx of right nephrectomy when she was a toddler and ESRD (M/W/F for approximately eight years via right AV Graft) presents to [facility] after suspected graft thrombosis. Patient last fully dialyzed on 8/9/19 and has no complaints or lab abnormalities that would indicate need for urgent hemodialysis. RUE US showed thrombosis of graft. Vascular surgery saw patient and recommended heparin drip and IR consultation for revision. Pt s/p right tunneled catheter placement.

## 2019-08-17 NOTE — PROGRESS NOTE ADULT - PROBLEM SELECTOR PROBLEM 4
Obesity (BMI 35.0-39.9 without comorbidity)

## 2019-08-17 NOTE — PROGRESS NOTE ADULT - PROBLEM SELECTOR PROBLEM 2
ESRD (end stage renal disease) on dialysis
Anemia due to chronic kidney disease, on chronic dialysis
Anemia due to chronic kidney disease, on chronic dialysis
ESRD (end stage renal disease) on dialysis
Anemia due to chronic kidney disease, on chronic dialysis
ESRD (end stage renal disease) on dialysis

## 2019-08-17 NOTE — PROGRESS NOTE ADULT - PROBLEM SELECTOR PROBLEM 3
Hypercalcemia
Bone metabolism disorder
Bone metabolism disorder
Hypercalcemia
Bone metabolism disorder
Hypercalcemia

## 2019-08-17 NOTE — PROGRESS NOTE ADULT - REASON FOR ADMISSION
Suspected Clotted AV Graft

## 2019-08-17 NOTE — PROGRESS NOTE ADULT - PROBLEM SELECTOR PLAN 3
-Asymptomatic right now, no acute intervention
-f/up PTH t  -Asymptomatic right now, no acute intervention
Pt. on phosphate binders with meals. serum phosphorus level elevated. Low phosphorus diet. Monitor serum phosphorus.
Pt. on phosphate binders with meals. serum phosphorus level elevated. Low phosphorus diet. Monitor serum phosphorus.
-f/up PTH t  -Asymptomatic right now, no acute intervention
Pt. on phosphate binders with meals. serum phosphorus level elevated. Low phosphorus diet. Monitor serum phosphorus.

## 2019-08-17 NOTE — PROGRESS NOTE ADULT - PROBLEM SELECTOR PLAN 2
- gets HD M/W/F  -Continue with Renvela  - monitor BMP  - renal f/u
Patient with anemia in the setting of ESRD. Hemoglobin at target range. Will hold EPO for now. Pt on venofer 50mcg and EPO 10,000U TIW. Monitor hemoglobin.
Patient with anemia in the setting of ESRD. Hemoglobin at target range. Will hold EPO for now. Pt on venofer 50mcg and EPO 10,000U TIW. Monitor hemoglobin.
- gets HD M/W/F  - last HD was on friday  - Nephrology eval  -Continue with Renvela  - monitor BMP
Patient with anemia in the setting of ESRD. Hemoglobin at target range. Will hold EPO for now. Pt on venofer 50mcg and EPO 10,000U TIW. Monitor hemoglobin.

## 2019-08-17 NOTE — PROGRESS NOTE ADULT - PROVIDER SPECIALTY LIST ADULT
Hospitalist
Intervent Radiology
Intervent Radiology
Nephrology
Vascular Surgery
Intervent Radiology
Hospitalist

## 2019-08-17 NOTE — PROGRESS NOTE ADULT - PROBLEM SELECTOR PLAN 1
-c/w Heparin drip, cannot do Lovenox bridge due to ESRD   -Pt s/p right tunneled catheter  -pending inr today, resume coumadin   - has had h/o AVF thrombus in the past 2/2 Protein C deficiency  - On Coumadin for Protein C deficiency
-Heparin drip bridge to coumadin, cannot do Lovenox bridge due to ESRD   -Pt s/p right tunneled catheter  -Pt's INR now therapeutic at 2.25, stabel for discharge  - has had h/o AVF thrombus in the past 2/2 Protein C deficiency  - On Coumadin for Protein C deficiency  - vascular follow up as outpt
-c/w Heparin drip   -IR consult for AVF thrombectomy  - has had h/o AVF thrombus in the past 2/2 Protein C deficiency  - On Coumadin for Protein C deficiency  - INR subtherapeutic ; will hold coumadin for now in light of IR procedure
-c/w Heparin drip   -Pt s/p ritght tunneled catheter  -pending inr today, resume coumadin   - has had h/o AVF thrombus in the past 2/2 Protein C deficiency  - On Coumadin for Protein C deficiency
-c/w Heparin drip, cannot do Lovenox bridge due to ESRD   -Pt s/p right tunneled catheter  -coumadin 8mg tonight   - has had h/o AVF thrombus in the past 2/2 Protein C deficiency  - On Coumadin for Protein C deficiency  - vascular follow up as outpt   - if inr >1.9-2 in am, discharge home
-c/w Heparin drip, cannot do Lovenox bridge due to ESRD   -Pt s/p right tunneled catheter  -coumadin 8mg tonight   - has had h/o AVF thrombus in the past 2/2 Protein C deficiency  - On Coumadin for Protein C deficiency  - vascular follow up today for long term avg placement
Pt. with ESRD on HD TIW (MWF). Last HD as outpatient was on 8/9/18 via AVG. Pt. clinically stable however AVG malfunctioning. Serum potassium WNL today. Will arrange for maintenance HD today after thrombectomy. Monitor labs.
Pt. with ESRD on HD TIW (MWF). Last HD on 8/14/18 via tunneled HD catheter. Pt. clinically stable however AVG malfunctioning. Serum potassium WNL today. Plan for HD today. Vascular surgery noted. Monitor labs.
-c/w Heparin drip   -IR consult for AVF thrombectomy  - has had h/o AVF thrombus in the past 2/2 Protein C deficiency  - On Coumadin for Protein C deficiency  - INR subtherapeutic ; will hold coumadin for now in light of possible IR procedure
Pt. with ESRD on HD TIW (MWF). Last HD as outpatient was on 8/12/18 via tunneled HD catheter. Pt. clinically stable however AVG malfunctioning. Serum potassium WNL today. Plan for HD today. Recommend vascular surgery follow up for long-term AVG plan. Monitor labs.

## 2019-08-17 NOTE — PROGRESS NOTE ADULT - PROBLEM SELECTOR PLAN 4
-Nutrition consult

## 2019-08-17 NOTE — PROGRESS NOTE ADULT - PROBLEM SELECTOR PROBLEM 1
AV graft thrombosis, initial encounter
ESRD (end stage renal disease) on dialysis
ESRD (end stage renal disease) on dialysis
AV graft thrombosis, initial encounter
ESRD (end stage renal disease) on dialysis

## 2019-09-07 NOTE — PROGRESS NOTE ADULT - SUBJECTIVE AND OBJECTIVE BOX
Patient is a 33y old  Female who presents with a chief complaint of Suspected Clotted AV Graft (10 Aug 2019 21:16)      SUBJECTIVE / OVERNIGHT EVENTS:    MEDICATIONS  (STANDING):  heparin  Infusion.  Unit(s)/Hr (15 mL/Hr) IV Continuous <Continuous>  sevelamer carbonate 800 milliGRAM(s) Oral three times a day with meals    MEDICATIONS  (PRN):  heparin  Injectable 6500 Unit(s) IV Push every 6 hours PRN For aPTT less than 40  heparin  Injectable 3000 Unit(s) IV Push every 6 hours PRN For aPTT between 40 - 57      Vital Signs Last 24 Hrs  T(C): 36.7 (11 Aug 2019 05:27), Max: 37.1 (10 Aug 2019 17:20)  T(F): 98 (11 Aug 2019 05:27), Max: 98.7 (10 Aug 2019 17:20)  HR: 86 (11 Aug 2019 05:27) (86 - 99)  BP: 122/- (11 Aug 2019 05:27) (101/78 - 122/-)  BP(mean): --  RR: 18 (11 Aug 2019 05:27) (16 - 18)  SpO2: 99% (11 Aug 2019 05:27) (95% - 99%)  CAPILLARY BLOOD GLUCOSE        I&O's Summary    10 Aug 2019 07:01  -  11 Aug 2019 07:00  --------------------------------------------------------  IN: 0 mL / OUT: 0 mL / NET: 0 mL        PHYSICAL EXAM:  GENERAL: NAD, well-developed  HEAD:  Atraumatic, Normocephalic  EYES: EOMI, PERRLA, conjunctiva and sclera clear  NECK: Supple, No JVD  CHEST/LUNG: Clear to auscultation bilaterally; No wheeze  HEART: Regular rate and rhythm; No murmurs, rubs, or gallops  ABDOMEN: Soft, Nontender, Nondistended; Bowel sounds present  EXTREMITIES:  2+ Peripheral Pulses, No clubbing, cyanosis, or edema  PSYCH: AAOx3  NEUROLOGY: non-focal  SKIN: No rashes or lesions    LABS:                        14.4   11.0  )-----------( 271      ( 10 Aug 2019 18:26 )             42.2     08-11    135  |  87<L>  |  50<H>  ----------------------------<  69<L>  4.5   |  27  |  12.72<H>    Ca    9.5      11 Aug 2019 07:32  Phos  7.3     08-11    TPro  9.1<H>  /  Alb  4.8  /  TBili  1.1  /  DBili  x   /  AST  12  /  ALT  7<L>  /  AlkPhos  85  08-10    PT/INR - ( 10 Aug 2019 18:26 )   PT: 13.5 sec;   INR: 1.17 ratio         PTT - ( 11 Aug 2019 07:30 )  PTT:80.9 sec          RADIOLOGY & ADDITIONAL TESTS:    Imaging Personally Reviewed:    Consultant(s) Notes Reviewed:      Care Discussed with Consultants/Other Providers: Patient is a 33y old  Female who presents with a chief complaint of Suspected Clotted AV Graft (10 Aug 2019 21:16)      SUBJECTIVE / OVERNIGHT EVENTS:  Pt seen and examined. No acute events overnight. Pt denies RUE pain. Seen by Vascular sx; reccs appreciated.    MEDICATIONS  (STANDING):  heparin  Infusion.  Unit(s)/Hr (15 mL/Hr) IV Continuous <Continuous>  sevelamer carbonate 800 milliGRAM(s) Oral three times a day with meals    MEDICATIONS  (PRN):  heparin  Injectable 6500 Unit(s) IV Push every 6 hours PRN For aPTT less than 40  heparin  Injectable 3000 Unit(s) IV Push every 6 hours PRN For aPTT between 40 - 57      Vital Signs Last 24 Hrs  T(C): 36.7 (11 Aug 2019 05:27), Max: 37.1 (10 Aug 2019 17:20)  T(F): 98 (11 Aug 2019 05:27), Max: 98.7 (10 Aug 2019 17:20)  HR: 86 (11 Aug 2019 05:27) (86 - 99)  BP: 122/- (11 Aug 2019 05:27) (101/78 - 122/-)  BP(mean): --  RR: 18 (11 Aug 2019 05:27) (16 - 18)  SpO2: 99% (11 Aug 2019 05:27) (95% - 99%)  CAPILLARY BLOOD GLUCOSE        I&O's Summary    10 Aug 2019 07:01  -  11 Aug 2019 07:00  --------------------------------------------------------  IN: 0 mL / OUT: 0 mL / NET: 0 mL        PHYSICAL EXAM:  GENERAL: NAD, anicteric, afebrile  HEAD:  Atraumatic, Normocephalic  EYES: EOMI, PERRLA, conjunctiva and sclera clear  NECK: Supple, No JVD  CHEST/LUNG: Clear to auscultation bilaterally; No wheeze  HEART: Regular rate and rhythm; No murmurs, rubs, or gallops  ABDOMEN: Soft, Nontender, Nondistended; Bowel sounds present  EXTREMITIES:  RUE AV fistula ; no palpable thrill  PSYCH: AAOx3  NEUROLOGY: non-focal  SKIN: No rashes or lesions    LABS:                        14.4   11.0  )-----------( 271      ( 10 Aug 2019 18:26 )             42.2     08-11    135  |  87<L>  |  50<H>  ----------------------------<  69<L>  4.5   |  27  |  12.72<H>    Ca    9.5      11 Aug 2019 07:32  Phos  7.3     08-11    TPro  9.1<H>  /  Alb  4.8  /  TBili  1.1  /  DBili  x   /  AST  12  /  ALT  7<L>  /  AlkPhos  85  08-10    PT/INR - ( 10 Aug 2019 18:26 )   PT: 13.5 sec;   INR: 1.17 ratio         PTT - ( 11 Aug 2019 07:30 )  PTT:80.9 sec      DUPLEX HEMODIALYSIS ACCESS RT       08/10/2019       Complete thrombosis of right upper extremity arteriovenous fistula. No Yes

## 2019-09-16 ENCOUNTER — EMERGENCY (EMERGENCY)
Facility: HOSPITAL | Age: 34
LOS: 1 days | Discharge: HOME | End: 2019-09-16
Attending: EMERGENCY MEDICINE
Payer: MEDICAID

## 2019-09-16 VITALS
OXYGEN SATURATION: 99 % | RESPIRATION RATE: 18 BRPM | SYSTOLIC BLOOD PRESSURE: 116 MMHG | WEIGHT: 184.09 LBS | TEMPERATURE: 99 F | DIASTOLIC BLOOD PRESSURE: 75 MMHG | HEIGHT: 59 IN | HEART RATE: 100 BPM

## 2019-09-16 DIAGNOSIS — Z90.5 ACQUIRED ABSENCE OF KIDNEY: Chronic | ICD-10-CM

## 2019-09-16 DIAGNOSIS — Z98.891 HISTORY OF UTERINE SCAR FROM PREVIOUS SURGERY: Chronic | ICD-10-CM

## 2019-09-16 DIAGNOSIS — Z98.89 OTHER SPECIFIED POSTPROCEDURAL STATES: Chronic | ICD-10-CM

## 2019-09-16 LAB
ALBUMIN SERPL ELPH-MCNC: 4.8 G/DL — SIGNIFICANT CHANGE UP (ref 3.3–5)
ALP SERPL-CCNC: 110 U/L — SIGNIFICANT CHANGE UP (ref 40–120)
ALT FLD-CCNC: 10 U/L — SIGNIFICANT CHANGE UP (ref 10–45)
ANION GAP SERPL CALC-SCNC: 18 MMOL/L — HIGH (ref 5–17)
APTT BLD: 30.6 SEC — SIGNIFICANT CHANGE UP (ref 27.5–36.3)
APTT BLD: 31.3 SEC — SIGNIFICANT CHANGE UP (ref 27.5–36.3)
AST SERPL-CCNC: 15 U/L — SIGNIFICANT CHANGE UP (ref 10–40)
BASOPHILS # BLD AUTO: 0.1 K/UL — SIGNIFICANT CHANGE UP (ref 0–0.2)
BASOPHILS NFR BLD AUTO: 0.6 % — SIGNIFICANT CHANGE UP (ref 0–2)
BILIRUB SERPL-MCNC: 0.6 MG/DL — SIGNIFICANT CHANGE UP (ref 0.2–1.2)
BUN SERPL-MCNC: 22 MG/DL — SIGNIFICANT CHANGE UP (ref 7–23)
CALCIUM SERPL-MCNC: 10.1 MG/DL — SIGNIFICANT CHANGE UP (ref 8.4–10.5)
CHLORIDE SERPL-SCNC: 87 MMOL/L — LOW (ref 96–108)
CO2 SERPL-SCNC: 28 MMOL/L — SIGNIFICANT CHANGE UP (ref 22–31)
CREAT SERPL-MCNC: 7.2 MG/DL — HIGH (ref 0.5–1.3)
EOSINOPHIL # BLD AUTO: 0.4 K/UL — SIGNIFICANT CHANGE UP (ref 0–0.5)
EOSINOPHIL NFR BLD AUTO: 4.3 % — SIGNIFICANT CHANGE UP (ref 0–6)
GLUCOSE SERPL-MCNC: 105 MG/DL — HIGH (ref 70–99)
HCT VFR BLD CALC: 28.9 % — LOW (ref 34.5–45)
HGB BLD-MCNC: 10.1 G/DL — LOW (ref 11.5–15.5)
INR BLD: 1.22 RATIO — HIGH (ref 0.88–1.16)
INR BLD: 1.26 RATIO — HIGH (ref 0.88–1.16)
LYMPHOCYTES # BLD AUTO: 2.3 K/UL — SIGNIFICANT CHANGE UP (ref 1–3.3)
LYMPHOCYTES # BLD AUTO: 22.5 % — SIGNIFICANT CHANGE UP (ref 13–44)
MCHC RBC-ENTMCNC: 34.6 PG — HIGH (ref 27–34)
MCHC RBC-ENTMCNC: 35.1 GM/DL — SIGNIFICANT CHANGE UP (ref 32–36)
MCV RBC AUTO: 98.4 FL — SIGNIFICANT CHANGE UP (ref 80–100)
MONOCYTES # BLD AUTO: 0.5 K/UL — SIGNIFICANT CHANGE UP (ref 0–0.9)
MONOCYTES NFR BLD AUTO: 4.9 % — SIGNIFICANT CHANGE UP (ref 2–14)
NEUTROPHILS # BLD AUTO: 6.9 K/UL — SIGNIFICANT CHANGE UP (ref 1.8–7.4)
NEUTROPHILS NFR BLD AUTO: 67.7 % — SIGNIFICANT CHANGE UP (ref 43–77)
PLATELET # BLD AUTO: 266 K/UL — SIGNIFICANT CHANGE UP (ref 150–400)
POTASSIUM SERPL-MCNC: 4.9 MMOL/L — SIGNIFICANT CHANGE UP (ref 3.5–5.3)
POTASSIUM SERPL-SCNC: 4.9 MMOL/L — SIGNIFICANT CHANGE UP (ref 3.5–5.3)
PROT SERPL-MCNC: 9.5 G/DL — HIGH (ref 6–8.3)
PROTHROM AB SERPL-ACNC: 14.1 SEC — HIGH (ref 10–12.9)
PROTHROM AB SERPL-ACNC: 14.6 SEC — HIGH (ref 10–12.9)
RBC # BLD: 2.93 M/UL — LOW (ref 3.8–5.2)
RBC # FLD: 12.8 % — SIGNIFICANT CHANGE UP (ref 10.3–14.5)
SODIUM SERPL-SCNC: 133 MMOL/L — LOW (ref 135–145)
WBC # BLD: 10.2 K/UL — SIGNIFICANT CHANGE UP (ref 3.8–10.5)
WBC # FLD AUTO: 10.2 K/UL — SIGNIFICANT CHANGE UP (ref 3.8–10.5)

## 2019-09-16 PROCEDURE — 85610 PROTHROMBIN TIME: CPT

## 2019-09-16 PROCEDURE — 99284 EMERGENCY DEPT VISIT MOD MDM: CPT

## 2019-09-16 PROCEDURE — 85027 COMPLETE CBC AUTOMATED: CPT

## 2019-09-16 PROCEDURE — 99283 EMERGENCY DEPT VISIT LOW MDM: CPT

## 2019-09-16 PROCEDURE — 85730 THROMBOPLASTIN TIME PARTIAL: CPT

## 2019-09-16 PROCEDURE — 80053 COMPREHEN METABOLIC PANEL: CPT

## 2019-09-16 NOTE — ED ADULT NURSE NOTE - OBJECTIVE STATEMENT
34 yo F pmh of protein C difficiency, on coumadin, hx of right nephrectomy, on dialysis, AV fistula in right arm, and chest port in right side, MWF dialysis ambulated to ED s/p finding out about elevated INR levels.   Denies any complaints at this time, CP, back pain, SOB, fevers/chills, n/v/d, lightheadedness, dizziness, changes in urinary or bowel habits.  A&Ox4, VSS, skin w/d/i, gross neuro intact.  Safety and comfort maintained.  Will continue to monitor.

## 2019-09-16 NOTE — ED PROVIDER NOTE - PROGRESS NOTE DETAILS
Endorsed to Dr Amando Rico MD, Facep INR subtherapeutic, paged hematology for management of AC c/b protein C deficiency spoke to Dr. Martinez (heme/onc fellow), will do 7.5 of coumadin tomorrow and have INR rechecked prior to dialysis on Wednesday, will also pass the message along to Dr. Hidalgo so she can have appropriate follow up this week. Discussed with patient, ok for dc  Erica Ramey, PGY-3 EM

## 2019-09-16 NOTE — ED STATDOCS - PROGRESS NOTE DETAILS
Attending Mi Humphrey DO, cosign the chart. I was available for supervisory role while patient in the waiting room but did not directly see or examine the patient. Patient was later seen in ER by another ER attending.

## 2019-09-16 NOTE — ED PROVIDER NOTE - ATTENDING CONTRIBUTION TO CARE
Private Physician Shon Hastings Nephro  33y 33y  R nephrectomy, ESRD on HD M/W/F (x 8 years), protein C deficiency on coumadin, Sent for elevated  inr today. Pt denies cp/sob/hemoptysis,gi bleeding. abd pain. PE WDWN female awake alert normocephalic atraumatic neck supple chest clear anterior & posterior abd soft  +bs neuro no focal defects  Heber Rico MD, Facep

## 2019-09-16 NOTE — ED PROVIDER NOTE - NSFOLLOWUPINSTRUCTIONS_ED_ALL_ED_FT
Please take one dose of coumadin 7.5mg tomorrow and have INR rechecked on Wednesday at dialysis. Follow up with Dr. Hidalgo on Friday-he was made aware of your visit today. Please return with any questions or concerns.

## 2019-09-16 NOTE — ED PROVIDER NOTE - OBJECTIVE STATEMENT
34 yo F hx of right nephrectomy, ESRD (M/W/F), protein C deficiency on coumadin presents with elevated INR prior to dialysis today. Pt reports she has been in good health and has no complaints at this time, including cp, sob, abd pain, fever/chills or any other concerns. Pt denies bleeding of any kind including epistaxis, GI bleeding, hematuria or any other concerns.

## 2019-09-16 NOTE — ED PROVIDER NOTE - FAMILY HISTORY
FH: hypertension     Mother  Still living? Unknown  FH: diabetes mellitus, Age at diagnosis: Age Unknown

## 2019-09-16 NOTE — ED PROVIDER NOTE - CLINICAL SUMMARY MEDICAL DECISION MAKING FREE TEXT BOX
Pt presents 2/2 elevated INR at dialysis, unclear how high, here was subtherapeutic initially, repeat pending, pt is well appearing, hd stable, afebrile with a non-focal exam. Likely to dc pending rpt INR  Erica Ramey, PGY-3 EM

## 2019-09-16 NOTE — ED PROVIDER NOTE - PATIENT PORTAL LINK FT
You can access the FollowMyHealth Patient Portal offered by [facility] by registering at the following website: [email or web address]. By joining [facility]’s FollowMyHealth portal, you will also be able to view your health information using other applications (apps) compatible with our system.

## 2019-09-17 ENCOUNTER — APPOINTMENT (OUTPATIENT)
Dept: VASCULAR SURGERY | Facility: CLINIC | Age: 34
End: 2019-09-17

## 2019-09-17 VITALS
SYSTOLIC BLOOD PRESSURE: 104 MMHG | TEMPERATURE: 98 F | DIASTOLIC BLOOD PRESSURE: 74 MMHG | OXYGEN SATURATION: 99 % | RESPIRATION RATE: 18 BRPM | HEART RATE: 90 BPM

## 2019-09-20 ENCOUNTER — OUTPATIENT (OUTPATIENT)
Dept: OUTPATIENT SERVICES | Facility: HOSPITAL | Age: 34
LOS: 1 days | Discharge: HOME | End: 2019-09-20

## 2019-09-20 DIAGNOSIS — D68.59 OTHER PRIMARY THROMBOPHILIA: ICD-10-CM

## 2019-09-20 DIAGNOSIS — Z90.5 ACQUIRED ABSENCE OF KIDNEY: Chronic | ICD-10-CM

## 2019-09-20 DIAGNOSIS — Z98.891 HISTORY OF UTERINE SCAR FROM PREVIOUS SURGERY: Chronic | ICD-10-CM

## 2019-09-20 DIAGNOSIS — Z98.89 OTHER SPECIFIED POSTPROCEDURAL STATES: Chronic | ICD-10-CM

## 2019-09-20 PROBLEM — Z00.00 ENCOUNTER FOR PREVENTIVE HEALTH EXAMINATION: Noted: 2019-09-20

## 2019-09-23 ENCOUNTER — RESULT REVIEW (OUTPATIENT)
Age: 34
End: 2019-09-23

## 2019-09-23 ENCOUNTER — APPOINTMENT (OUTPATIENT)
Dept: HEMATOLOGY ONCOLOGY | Facility: CLINIC | Age: 34
End: 2019-09-23
Payer: MEDICAID

## 2019-09-23 VITALS
BODY MASS INDEX: 35.46 KG/M2 | HEIGHT: 60.04 IN | WEIGHT: 182.98 LBS | OXYGEN SATURATION: 97 % | DIASTOLIC BLOOD PRESSURE: 59 MMHG | TEMPERATURE: 98.6 F | HEART RATE: 118 BPM | RESPIRATION RATE: 18 BRPM | SYSTOLIC BLOOD PRESSURE: 93 MMHG

## 2019-09-23 LAB
BASOPHILS # BLD AUTO: 0.1 K/UL — SIGNIFICANT CHANGE UP (ref 0–0.2)
BASOPHILS NFR BLD AUTO: 0.7 % — SIGNIFICANT CHANGE UP (ref 0–2)
EOSINOPHIL # BLD AUTO: 0.2 K/UL — SIGNIFICANT CHANGE UP (ref 0–0.5)
EOSINOPHIL NFR BLD AUTO: 2.2 % — SIGNIFICANT CHANGE UP (ref 0–6)
HCT VFR BLD CALC: 27.8 % — LOW (ref 34.5–45)
HGB BLD-MCNC: 9.4 G/DL — LOW (ref 11.5–15.5)
LYMPHOCYTES # BLD AUTO: 1.4 K/UL — SIGNIFICANT CHANGE UP (ref 1–3.3)
LYMPHOCYTES # BLD AUTO: 14.5 % — SIGNIFICANT CHANGE UP (ref 13–44)
MCHC RBC-ENTMCNC: 33.4 PG — SIGNIFICANT CHANGE UP (ref 27–34)
MCHC RBC-ENTMCNC: 33.9 G/DL — SIGNIFICANT CHANGE UP (ref 32–36)
MCV RBC AUTO: 98.7 FL — SIGNIFICANT CHANGE UP (ref 80–100)
MONOCYTES # BLD AUTO: 0.5 K/UL — SIGNIFICANT CHANGE UP (ref 0–0.9)
MONOCYTES NFR BLD AUTO: 5.1 % — SIGNIFICANT CHANGE UP (ref 2–14)
NEUTROPHILS # BLD AUTO: 7.2 K/UL — SIGNIFICANT CHANGE UP (ref 1.8–7.4)
NEUTROPHILS NFR BLD AUTO: 77.4 % — HIGH (ref 43–77)
PLATELET # BLD AUTO: 265 K/UL — SIGNIFICANT CHANGE UP (ref 150–400)
RBC # BLD: 2.82 M/UL — LOW (ref 3.8–5.2)
RBC # FLD: 13.4 % — SIGNIFICANT CHANGE UP (ref 10.3–14.5)
WBC # BLD: 9.3 K/UL — SIGNIFICANT CHANGE UP (ref 3.8–10.5)
WBC # FLD AUTO: 9.3 K/UL — SIGNIFICANT CHANGE UP (ref 3.8–10.5)

## 2019-09-23 PROCEDURE — 99214 OFFICE O/P EST MOD 30 MIN: CPT

## 2019-09-23 RX ORDER — WARFARIN SODIUM 2.5 MG/1
0 TABLET ORAL
Qty: 0 | Refills: 0 | DISCHARGE

## 2019-09-23 RX ORDER — WARFARIN 5 MG/1
5 TABLET ORAL
Qty: 30 | Refills: 5 | Status: ACTIVE | COMMUNITY
Start: 2017-02-02 | End: 1900-01-01

## 2019-09-23 NOTE — ASSESSMENT
[FreeTextEntry1] : This is a 33 year old woman with hx of ESRF on HD, Protein C deficiency, on coumadin, INR subtherapeutic over the summer despite coumadin 5mg daily, dose has not been adjusted for a while. Check INR today. Hx of INR that does shoot high into the 5's range despite being normal at the previous doses just prior to that for some time. Unclear why, She did state that the INR is drawn from either the fistula or the shiley most of the time at dialysis. CHeck INR here by peripheral finger stick to verify accuracy of the one drawn at dialysis.  Redose coumadinbased on this result\par \par \par Addendum, called patient back at 7 PM, dialysis INR 1.38, and 1.33 here at [facility], the INR at dialysis through the Shiley catheter does appear to be accurate.  patient seems to have tolerated the subtherapeutic INR without major incident save for the fistula clotting, there is nit much we can do to rescue that now.  Will start increasing dose of Coumadin by small increment steps until INR is therapeutic, goign to fast could lead to the supra therapeutic levels all over again.

## 2019-09-23 NOTE — HISTORY OF PRESENT ILLNESS
[de-identified] : Ms. Kaye is a 30-year-old  woman referred for evaluation of a low protein C level which was ascertained on a hypercoagulability workup for pregnancy losses.\par She has a history of 2 pregnancy losses in 2013 2014 both in mid trimester.\par placental pathology from both pregnancies is available. 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 No thrombosis/infarction was described\par in October 2014 placental pathology revealed amniotic sac infection with retro-placenta hematoma\par On November 2014 protein C levels were done and the level was 59 with the range of . Protein S was low normal and antithrombin III levels were normal.there was no evidence of a lupus anticoagulant.\par She has a history of a clot in an AV graft in the right arm in February 2015. Otherwise she has no history of clotting events it is no history of clotting events in the family. \par \par Nephrology Dr. Sunitha christensen.  \par Vascular kika Kenyon [de-identified] : Patient presents for routine follow up of her prothrombic state, protein C deficiency. She had been on couamdin for the last 3 months, but only recently noticed that the INR's were being checked but was without instructions on how to redose the coumadin.  Patient's previous hematologist had left the practice in July.  She presents today to establish care with a new hematologist.  Was on the same dose of couamdin 5mg daily for the past 3 moths .INR was more or less consistently low at about 1.2-1.3 for past 2 months. She gets it checked weekly on Mondays with dialysis.  She feels well and has no complaints however her dialysis fistula had clotted in August and could not be clered, she now has a right subclavian shiley in place.

## 2019-09-23 NOTE — REVIEW OF SYSTEMS
[Negative] : Heme/Lymph [FreeTextEntry9] : right arm shiley clotted.   [de-identified] : mild dizziness when getting up fast or bendign down.

## 2019-10-01 ENCOUNTER — OUTPATIENT (OUTPATIENT)
Dept: OUTPATIENT SERVICES | Facility: HOSPITAL | Age: 34
LOS: 1 days | End: 2019-10-01
Payer: MEDICAID

## 2019-10-01 DIAGNOSIS — Z90.5 ACQUIRED ABSENCE OF KIDNEY: Chronic | ICD-10-CM

## 2019-10-01 DIAGNOSIS — Z98.891 HISTORY OF UTERINE SCAR FROM PREVIOUS SURGERY: Chronic | ICD-10-CM

## 2019-10-01 DIAGNOSIS — Z98.89 OTHER SPECIFIED POSTPROCEDURAL STATES: Chronic | ICD-10-CM

## 2019-10-01 LAB
INR PPP: 1.38 RATIO
PT BLD: 15.9 SEC

## 2019-10-01 PROCEDURE — G9001: CPT

## 2019-10-03 ENCOUNTER — APPOINTMENT (OUTPATIENT)
Dept: VASCULAR SURGERY | Facility: CLINIC | Age: 34
End: 2019-10-03
Payer: MEDICAID

## 2019-10-03 VITALS
HEART RATE: 84 BPM | DIASTOLIC BLOOD PRESSURE: 71 MMHG | BODY MASS INDEX: 35.73 KG/M2 | HEIGHT: 60 IN | SYSTOLIC BLOOD PRESSURE: 106 MMHG | WEIGHT: 182 LBS

## 2019-10-03 PROBLEM — N18.6 END STAGE RENAL DISEASE: Chronic | Status: ACTIVE | Noted: 2019-08-10

## 2019-10-03 PROCEDURE — 99213 OFFICE O/P EST LOW 20 MIN: CPT

## 2019-10-03 PROCEDURE — 93990 DOPPLER FLOW TESTING: CPT

## 2019-10-09 DIAGNOSIS — Z71.89 OTHER SPECIFIED COUNSELING: ICD-10-CM

## 2019-10-15 ENCOUNTER — MESSAGE (OUTPATIENT)
Age: 34
End: 2019-10-15

## 2019-10-16 ENCOUNTER — MESSAGE (OUTPATIENT)
Age: 34
End: 2019-10-16

## 2019-10-16 ENCOUNTER — MEDICATION RENEWAL (OUTPATIENT)
Age: 34
End: 2019-10-16

## 2019-10-17 ENCOUNTER — OUTPATIENT (OUTPATIENT)
Dept: OUTPATIENT SERVICES | Facility: HOSPITAL | Age: 34
LOS: 1 days | End: 2019-10-17
Payer: MEDICAID

## 2019-10-17 VITALS — WEIGHT: 184.09 LBS | RESPIRATION RATE: 16 BRPM | HEIGHT: 59 IN

## 2019-10-17 DIAGNOSIS — N18.6 END STAGE RENAL DISEASE: ICD-10-CM

## 2019-10-17 DIAGNOSIS — Z98.89 OTHER SPECIFIED POSTPROCEDURAL STATES: Chronic | ICD-10-CM

## 2019-10-17 DIAGNOSIS — Z90.5 ACQUIRED ABSENCE OF KIDNEY: Chronic | ICD-10-CM

## 2019-10-17 DIAGNOSIS — D68.59 OTHER PRIMARY THROMBOPHILIA: ICD-10-CM

## 2019-10-17 DIAGNOSIS — Z98.891 HISTORY OF UTERINE SCAR FROM PREVIOUS SURGERY: Chronic | ICD-10-CM

## 2019-10-17 DIAGNOSIS — Z95.828 PRESENCE OF OTHER VASCULAR IMPLANTS AND GRAFTS: Chronic | ICD-10-CM

## 2019-10-17 DIAGNOSIS — Z01.818 ENCOUNTER FOR OTHER PREPROCEDURAL EXAMINATION: ICD-10-CM

## 2019-10-17 LAB
ANION GAP SERPL CALC-SCNC: 8 MMOL/L — SIGNIFICANT CHANGE UP (ref 5–17)
APTT BLD: 32.3 SEC — SIGNIFICANT CHANGE UP (ref 27.5–36.3)
BLD GP AB SCN SERPL QL: SIGNIFICANT CHANGE UP
BUN SERPL-MCNC: 32 MG/DL — HIGH (ref 7–18)
CALCIUM SERPL-MCNC: 9.2 MG/DL — SIGNIFICANT CHANGE UP (ref 8.4–10.5)
CHLORIDE SERPL-SCNC: 92 MMOL/L — LOW (ref 96–108)
CO2 SERPL-SCNC: 32 MMOL/L — HIGH (ref 22–31)
CREAT SERPL-MCNC: 9.11 MG/DL — HIGH (ref 0.5–1.3)
GLUCOSE SERPL-MCNC: 102 MG/DL — HIGH (ref 70–99)
HCG SERPL-ACNC: 2 MIU/ML — SIGNIFICANT CHANGE UP
HCT VFR BLD CALC: 33.7 % — LOW (ref 34.5–45)
HGB BLD-MCNC: 11.1 G/DL — LOW (ref 11.5–15.5)
INR BLD: 1.66 RATIO — HIGH (ref 0.88–1.16)
MCHC RBC-ENTMCNC: 32.9 GM/DL — SIGNIFICANT CHANGE UP (ref 32–36)
MCHC RBC-ENTMCNC: 33.8 PG — SIGNIFICANT CHANGE UP (ref 27–34)
MCV RBC AUTO: 102.7 FL — HIGH (ref 80–100)
NRBC # BLD: 0 /100 WBCS — SIGNIFICANT CHANGE UP (ref 0–0)
PLATELET # BLD AUTO: 246 K/UL — SIGNIFICANT CHANGE UP (ref 150–400)
POTASSIUM SERPL-MCNC: 3.9 MMOL/L — SIGNIFICANT CHANGE UP (ref 3.5–5.3)
POTASSIUM SERPL-SCNC: 3.9 MMOL/L — SIGNIFICANT CHANGE UP (ref 3.5–5.3)
PROTHROM AB SERPL-ACNC: 18.7 SEC — HIGH (ref 10–12.9)
RBC # BLD: 3.28 M/UL — LOW (ref 3.8–5.2)
RBC # FLD: 15.2 % — HIGH (ref 10.3–14.5)
SODIUM SERPL-SCNC: 132 MMOL/L — LOW (ref 135–145)
WBC # BLD: 8.04 K/UL — SIGNIFICANT CHANGE UP (ref 3.8–10.5)
WBC # FLD AUTO: 8.04 K/UL — SIGNIFICANT CHANGE UP (ref 3.8–10.5)

## 2019-10-17 PROCEDURE — 85610 PROTHROMBIN TIME: CPT

## 2019-10-17 PROCEDURE — 86900 BLOOD TYPING SEROLOGIC ABO: CPT

## 2019-10-17 PROCEDURE — 86901 BLOOD TYPING SEROLOGIC RH(D): CPT

## 2019-10-17 PROCEDURE — 85027 COMPLETE CBC AUTOMATED: CPT

## 2019-10-17 PROCEDURE — 84702 CHORIONIC GONADOTROPIN TEST: CPT

## 2019-10-17 PROCEDURE — 87641 MR-STAPH DNA AMP PROBE: CPT

## 2019-10-17 PROCEDURE — 86850 RBC ANTIBODY SCREEN: CPT

## 2019-10-17 PROCEDURE — 87640 STAPH A DNA AMP PROBE: CPT

## 2019-10-17 PROCEDURE — 85730 THROMBOPLASTIN TIME PARTIAL: CPT

## 2019-10-17 PROCEDURE — 80048 BASIC METABOLIC PNL TOTAL CA: CPT

## 2019-10-17 PROCEDURE — 36415 COLL VENOUS BLD VENIPUNCTURE: CPT

## 2019-10-17 RX ORDER — SODIUM CHLORIDE 9 MG/ML
3 INJECTION INTRAMUSCULAR; INTRAVENOUS; SUBCUTANEOUS EVERY 8 HOURS
Refills: 0 | Status: DISCONTINUED | OUTPATIENT
Start: 2019-10-24 | End: 2019-11-04

## 2019-10-17 RX ORDER — WARFARIN SODIUM 2.5 MG/1
1 TABLET ORAL
Qty: 0 | Refills: 0 | DISCHARGE

## 2019-10-17 RX ORDER — SEVELAMER CARBONATE 2400 MG/1
1 POWDER, FOR SUSPENSION ORAL
Qty: 0 | Refills: 0 | DISCHARGE

## 2019-10-17 NOTE — H&P PST ADULT - HISTORY OF PRESENT ILLNESS
33 years ago Black female with history of ESRD on hemodialysis (M W F ), presently on [facility] transplant list, Protein C deficiency (on Coumadin) will stop 2 days before surgery. Pt had two prior av fistulas in the past and now has perma catheter in right upper chest wall for dialysis.

## 2019-10-17 NOTE — H&P PST ADULT - RS GEN PE MLT RESP DETAILS PC
good air movement/clear to auscultation bilaterally/normal/breath sounds equal/airway patent/respirations non-labored

## 2019-10-17 NOTE — H&P PST ADULT - NEGATIVE OPHTHALMOLOGIC SYMPTOMS
no lacrimation L/no lacrimation R/no photophobia/no blurred vision R/wears contacts/no blurred vision L/no diplopia

## 2019-10-17 NOTE — H&P PST ADULT - NSICDXPROBLEM_GEN_ALL_CORE_FT
PROBLEM DIAGNOSES  Problem: Protein C deficiency  Assessment and Plan: Pt was on coumadin will stop 2 days before surgery and placed on another medication by md for surgery    Problem: ESRD on dialysis  Assessment and Plan: schedule for right arm av graft placement on 0/24/2019

## 2019-10-17 NOTE — H&P PST ADULT - NSANTHOSAYNRD_GEN_A_CORE
No. NAYE screening performed.  STOP BANG Legend: 0-2 = LOW Risk; 3-4 = INTERMEDIATE Risk; 5-8 = HIGH Risk

## 2019-10-17 NOTE — H&P PST ADULT - NSICDXPASTSURGICALHX_GEN_ALL_CORE_FT
PAST SURGICAL HISTORY:  AVF (arteriovenous fistula) Right forearm AVF with PTFE 9-    History of      History of right nephrectomy     Permanent central venous catheter in place Aug 2019    Previous  section     S/P appendectomy     S/P cystoscopy bladder reduction    S/P Nephrectomy right side at 3mos old

## 2019-10-17 NOTE — H&P PST ADULT - NSICDXFAMILYHX_GEN_ALL_CORE_FT
FAMILY HISTORY:  Family history of hypertension  FH: hypertension    Mother  Still living? Unknown  FH: diabetes mellitus, Age at diagnosis: Age Unknown

## 2019-10-17 NOTE — H&P PST ADULT - NSICDXPASTMEDICALHX_GEN_ALL_CORE_FT
PAST MEDICAL HISTORY:  Dialysis care MWF since 1/12    ESRD on dialysis     Gallstones     Kidney failure     Protein C deficiency on coumadin    Ureteral stent displacement removed 3/2013

## 2019-10-18 LAB
MRSA PCR RESULT.: SIGNIFICANT CHANGE UP
S AUREUS DNA NOSE QL NAA+PROBE: SIGNIFICANT CHANGE UP

## 2019-10-23 NOTE — PHYSICAL EXAM
[Redness surrounding] : no redness surrounding [Thrill] : no thrill [Pulsatile Thrill] : no pulsatile thrill [Aneurysm] : no aneurysm [Ulcer] : no ulcer [Gangrene] : no gangrene [2+] : right 2+ [Normal] : coordination grossly intact, no focal deficits [de-identified] : intact

## 2019-10-23 NOTE — HISTORY OF PRESENT ILLNESS
[FreeTextEntry1] : 34 yo female with history of esrd on hd via right upper extremity avg presents for follow up \par graft thrombosed\par has permacath [] : in right upper arm

## 2019-10-24 ENCOUNTER — OUTPATIENT (OUTPATIENT)
Dept: OUTPATIENT SERVICES | Facility: HOSPITAL | Age: 34
LOS: 1 days | End: 2019-10-24
Payer: MEDICAID

## 2019-10-24 ENCOUNTER — APPOINTMENT (OUTPATIENT)
Dept: VASCULAR SURGERY | Facility: HOSPITAL | Age: 34
End: 2019-10-24
Payer: MEDICAID

## 2019-10-24 VITALS
SYSTOLIC BLOOD PRESSURE: 123 MMHG | DIASTOLIC BLOOD PRESSURE: 70 MMHG | HEART RATE: 91 BPM | RESPIRATION RATE: 16 BRPM | WEIGHT: 184.09 LBS | TEMPERATURE: 98 F | OXYGEN SATURATION: 98 % | HEIGHT: 59 IN

## 2019-10-24 VITALS
HEART RATE: 86 BPM | SYSTOLIC BLOOD PRESSURE: 111 MMHG | OXYGEN SATURATION: 100 % | TEMPERATURE: 97 F | DIASTOLIC BLOOD PRESSURE: 70 MMHG | RESPIRATION RATE: 16 BRPM

## 2019-10-24 DIAGNOSIS — Z98.891 HISTORY OF UTERINE SCAR FROM PREVIOUS SURGERY: Chronic | ICD-10-CM

## 2019-10-24 DIAGNOSIS — Z95.828 PRESENCE OF OTHER VASCULAR IMPLANTS AND GRAFTS: Chronic | ICD-10-CM

## 2019-10-24 DIAGNOSIS — Z01.818 ENCOUNTER FOR OTHER PREPROCEDURAL EXAMINATION: ICD-10-CM

## 2019-10-24 DIAGNOSIS — Z98.89 OTHER SPECIFIED POSTPROCEDURAL STATES: Chronic | ICD-10-CM

## 2019-10-24 DIAGNOSIS — Z90.5 ACQUIRED ABSENCE OF KIDNEY: Chronic | ICD-10-CM

## 2019-10-24 DIAGNOSIS — N18.6 END STAGE RENAL DISEASE: ICD-10-CM

## 2019-10-24 LAB
ANION GAP SERPL CALC-SCNC: 8 MMOL/L — SIGNIFICANT CHANGE UP (ref 5–17)
APTT BLD: 28.8 SEC — SIGNIFICANT CHANGE UP (ref 27.5–36.3)
BLD GP AB SCN SERPL QL: SIGNIFICANT CHANGE UP
BUN SERPL-MCNC: 24 MG/DL — HIGH (ref 7–18)
CALCIUM SERPL-MCNC: 9.6 MG/DL — SIGNIFICANT CHANGE UP (ref 8.4–10.5)
CHLORIDE SERPL-SCNC: 93 MMOL/L — LOW (ref 96–108)
CO2 SERPL-SCNC: 32 MMOL/L — HIGH (ref 22–31)
CREAT SERPL-MCNC: 9.06 MG/DL — HIGH (ref 0.5–1.3)
GLUCOSE SERPL-MCNC: 87 MG/DL — SIGNIFICANT CHANGE UP (ref 70–99)
HCG SERPL-ACNC: 2 MIU/ML — SIGNIFICANT CHANGE UP
INR BLD: 1.11 RATIO — SIGNIFICANT CHANGE UP (ref 0.88–1.16)
POTASSIUM SERPL-MCNC: 4.5 MMOL/L — SIGNIFICANT CHANGE UP (ref 3.5–5.3)
POTASSIUM SERPL-SCNC: 4.5 MMOL/L — SIGNIFICANT CHANGE UP (ref 3.5–5.3)
PROTHROM AB SERPL-ACNC: 12.4 SEC — SIGNIFICANT CHANGE UP (ref 10–12.9)
SODIUM SERPL-SCNC: 133 MMOL/L — LOW (ref 135–145)

## 2019-10-24 PROCEDURE — 93010 ELECTROCARDIOGRAM REPORT: CPT

## 2019-10-24 PROCEDURE — 36830 ARTERY-VEIN NONAUTOGRAFT: CPT

## 2019-10-24 PROCEDURE — C1768: CPT

## 2019-10-24 PROCEDURE — 93005 ELECTROCARDIOGRAM TRACING: CPT

## 2019-10-24 PROCEDURE — 86901 BLOOD TYPING SEROLOGIC RH(D): CPT

## 2019-10-24 PROCEDURE — 85610 PROTHROMBIN TIME: CPT

## 2019-10-24 PROCEDURE — 85730 THROMBOPLASTIN TIME PARTIAL: CPT

## 2019-10-24 PROCEDURE — 36415 COLL VENOUS BLD VENIPUNCTURE: CPT

## 2019-10-24 PROCEDURE — 86900 BLOOD TYPING SEROLOGIC ABO: CPT

## 2019-10-24 PROCEDURE — C1757: CPT

## 2019-10-24 PROCEDURE — L8699: CPT

## 2019-10-24 PROCEDURE — 84702 CHORIONIC GONADOTROPIN TEST: CPT

## 2019-10-24 PROCEDURE — 86850 RBC ANTIBODY SCREEN: CPT

## 2019-10-24 PROCEDURE — 80048 BASIC METABOLIC PNL TOTAL CA: CPT

## 2019-10-24 PROCEDURE — C1889: CPT

## 2019-10-24 RX ORDER — CHLORHEXIDINE GLUCONATE 213 G/1000ML
1 SOLUTION TOPICAL ONCE
Refills: 0 | Status: COMPLETED | OUTPATIENT
Start: 2019-10-24 | End: 2019-10-24

## 2019-10-24 RX ORDER — ACETAMINOPHEN 500 MG
1000 TABLET ORAL ONCE
Refills: 0 | Status: DISCONTINUED | OUTPATIENT
Start: 2019-10-24 | End: 2019-10-24

## 2019-10-24 RX ORDER — APIXABAN 2.5 MG/1
1 TABLET, FILM COATED ORAL
Qty: 0 | Refills: 0 | DISCHARGE

## 2019-10-24 RX ORDER — SODIUM CHLORIDE 9 MG/ML
1000 INJECTION INTRAMUSCULAR; INTRAVENOUS; SUBCUTANEOUS
Refills: 0 | Status: DISCONTINUED | OUTPATIENT
Start: 2019-10-24 | End: 2019-10-24

## 2019-10-24 RX ADMIN — SODIUM CHLORIDE 3 MILLILITER(S): 9 INJECTION INTRAMUSCULAR; INTRAVENOUS; SUBCUTANEOUS at 09:57

## 2019-10-24 RX ADMIN — CHLORHEXIDINE GLUCONATE 1 APPLICATION(S): 213 SOLUTION TOPICAL at 09:56

## 2019-10-24 NOTE — BRIEF OPERATIVE NOTE - OPERATION/FINDINGS
right axillary vein identified, right brachial artery identified distal, small incision made in lateral position of right antecubital fossa to tunnel graft between these positions. Anastomosis with good flow demonstrated by doppler. Hemostasis achieved. Closure with vertical mattress.

## 2019-10-24 NOTE — ASU PREOP CHECKLIST - SELECT TESTS ORDERED
Results in MD note/CBC/BMP/HCG, BMP, T & S sent today, ECG done today, CXR cancelled as per Dr Murillo/Type and Screen/PT/PTT

## 2019-10-24 NOTE — ASU PATIENT PROFILE, ADULT - PMH
Dialysis care  MWF since 1/12  ESRD on dialysis    Gallstones    Kidney failure    Protein C deficiency  on coumadin  Ureteral stent displacement  removed 3/2013

## 2019-10-24 NOTE — ASU PATIENT PROFILE, ADULT - PSH
AVF (arteriovenous fistula)  Right forearm AVF with PTFE 9-  History of     History of right nephrectomy    Permanent central venous catheter in place  Aug 2019  Previous  section    S/P appendectomy    S/P cystoscopy  bladder reduction  S/P Nephrectomy  right side at 3mos old

## 2019-10-24 NOTE — ASU DISCHARGE PLAN (ADULT/PEDIATRIC) - CARE PROVIDER_API CALL
Edy Murillo)  Vascular Surgery  [address] [facility], [address]  [place] [address]  Phone: [phone]  Fax: [phone]  Follow Up Time:

## 2019-11-04 ENCOUNTER — APPOINTMENT (OUTPATIENT)
Dept: VASCULAR SURGERY | Facility: CLINIC | Age: 34
End: 2019-11-04
Payer: MEDICAID

## 2019-11-04 VITALS
DIASTOLIC BLOOD PRESSURE: 66 MMHG | HEIGHT: 60 IN | WEIGHT: 182 LBS | SYSTOLIC BLOOD PRESSURE: 109 MMHG | BODY MASS INDEX: 35.73 KG/M2 | HEART RATE: 102 BPM

## 2019-11-04 PROBLEM — D68.59 OTHER PRIMARY THROMBOPHILIA: Chronic | Status: ACTIVE | Noted: 2019-05-16

## 2019-11-04 PROCEDURE — 93990 DOPPLER FLOW TESTING: CPT

## 2019-11-04 PROCEDURE — 99024 POSTOP FOLLOW-UP VISIT: CPT

## 2019-11-04 NOTE — REASON FOR VISIT
[de-identified] : right upper extremity av graft  [de-identified] : 10/24/19 [de-identified] : 32 yo female with history of esrd on hd presents for follow up after right upper extremity avg \par pt without any complaints at this time.  pt denies any history of fevers, chills or discharge from the incision site\par pt currently has permcath as hd access without any issues

## 2019-11-04 NOTE — DISCUSSION/SUMMARY
[FreeTextEntry1] : 34 yo female with history of esrd on hd presents for follow up after right upper extremity avg \par sutures removed \par duplex shows

## 2019-11-04 NOTE — PHYSICAL EXAM
[2+] : right 2+ [de-identified] : appears well [de-identified] :  strength 5/5 b/l upper extremities\par  [de-identified] : incisions healed nicely, no erythema, palpable thrill over the right upper extremity avg

## 2019-11-12 ENCOUNTER — APPOINTMENT (OUTPATIENT)
Dept: VASCULAR SURGERY | Facility: CLINIC | Age: 34
End: 2019-11-12

## 2019-11-18 ENCOUNTER — APPOINTMENT (OUTPATIENT)
Dept: VASCULAR SURGERY | Facility: CLINIC | Age: 34
End: 2019-11-18
Payer: MEDICAID

## 2019-11-18 VITALS
WEIGHT: 182 LBS | HEIGHT: 60 IN | BODY MASS INDEX: 35.73 KG/M2 | HEART RATE: 104 BPM | SYSTOLIC BLOOD PRESSURE: 98 MMHG | DIASTOLIC BLOOD PRESSURE: 60 MMHG

## 2019-11-18 PROCEDURE — 99024 POSTOP FOLLOW-UP VISIT: CPT

## 2019-11-18 NOTE — REASON FOR VISIT
[de-identified] : Status post right upper extremity AV graft. Incisions are all well healed. There is a good thrill. AV graft can be accessed for hemodialysis. We will schedule the patient for catheter removal in 2 weeks.

## 2019-12-03 ENCOUNTER — APPOINTMENT (OUTPATIENT)
Dept: ENDOVASCULAR SURGERY | Facility: CLINIC | Age: 34
End: 2019-12-03
Payer: MEDICAID

## 2019-12-03 PROCEDURE — 36589 REMOVAL TUNNELED CV CATH: CPT | Mod: 58

## 2019-12-09 RX ORDER — SEVELAMER CARBONATE 800 MG/1
800 TABLET, FILM COATED ORAL 3 TIMES DAILY
Qty: 810 | Refills: 3 | Status: ACTIVE | COMMUNITY
Start: 1900-01-01 | End: 1900-01-01

## 2019-12-20 ENCOUNTER — OUTPATIENT (OUTPATIENT)
Dept: OUTPATIENT SERVICES | Facility: HOSPITAL | Age: 34
LOS: 1 days | Discharge: HOME | End: 2019-12-20

## 2019-12-20 DIAGNOSIS — D68.59 OTHER PRIMARY THROMBOPHILIA: ICD-10-CM

## 2019-12-20 DIAGNOSIS — Z98.891 HISTORY OF UTERINE SCAR FROM PREVIOUS SURGERY: Chronic | ICD-10-CM

## 2019-12-20 DIAGNOSIS — Z98.89 OTHER SPECIFIED POSTPROCEDURAL STATES: Chronic | ICD-10-CM

## 2019-12-20 DIAGNOSIS — Z90.5 ACQUIRED ABSENCE OF KIDNEY: Chronic | ICD-10-CM

## 2019-12-20 DIAGNOSIS — Z95.828 PRESENCE OF OTHER VASCULAR IMPLANTS AND GRAFTS: Chronic | ICD-10-CM

## 2019-12-27 ENCOUNTER — APPOINTMENT (OUTPATIENT)
Dept: HEMATOLOGY ONCOLOGY | Facility: CLINIC | Age: 34
End: 2019-12-27

## 2020-01-06 ENCOUNTER — APPOINTMENT (OUTPATIENT)
Dept: VASCULAR SURGERY | Facility: CLINIC | Age: 35
End: 2020-01-06

## 2020-10-13 ENCOUNTER — OUTPATIENT (OUTPATIENT)
Dept: OUTPATIENT SERVICES | Facility: HOSPITAL | Age: 35
LOS: 1 days | Discharge: HOME | End: 2020-10-13

## 2020-10-13 DIAGNOSIS — Z98.891 HISTORY OF UTERINE SCAR FROM PREVIOUS SURGERY: Chronic | ICD-10-CM

## 2020-10-13 DIAGNOSIS — Z95.828 PRESENCE OF OTHER VASCULAR IMPLANTS AND GRAFTS: Chronic | ICD-10-CM

## 2020-10-13 DIAGNOSIS — Z98.89 OTHER SPECIFIED POSTPROCEDURAL STATES: Chronic | ICD-10-CM

## 2020-10-13 DIAGNOSIS — Z90.5 ACQUIRED ABSENCE OF KIDNEY: Chronic | ICD-10-CM

## 2020-10-13 DIAGNOSIS — D68.59 OTHER PRIMARY THROMBOPHILIA: ICD-10-CM

## 2020-10-13 RX ORDER — APIXABAN 2.5 MG/1
2.5 TABLET, FILM COATED ORAL
Refills: 0 | Status: ACTIVE | COMMUNITY
Start: 2019-10-16

## 2020-10-14 ENCOUNTER — RESULT REVIEW (OUTPATIENT)
Age: 35
End: 2020-10-14

## 2020-10-14 ENCOUNTER — APPOINTMENT (OUTPATIENT)
Dept: HEMATOLOGY ONCOLOGY | Facility: CLINIC | Age: 35
End: 2020-10-14

## 2020-10-14 LAB
BASOPHILS # BLD AUTO: 0.04 K/UL — SIGNIFICANT CHANGE UP (ref 0–0.2)
BASOPHILS NFR BLD AUTO: 0.6 % — SIGNIFICANT CHANGE UP (ref 0–2)
EOSINOPHIL # BLD AUTO: 0.05 K/UL — SIGNIFICANT CHANGE UP (ref 0–0.5)
EOSINOPHIL NFR BLD AUTO: 0.8 % — SIGNIFICANT CHANGE UP (ref 0–6)
HCT VFR BLD CALC: 33.5 % — LOW (ref 34.5–45)
HGB BLD-MCNC: 11.1 G/DL — LOW (ref 11.5–15.5)
IMM GRANULOCYTES NFR BLD AUTO: 0.8 % — SIGNIFICANT CHANGE UP (ref 0–1.5)
LYMPHOCYTES # BLD AUTO: 0.8 K/UL — LOW (ref 1–3.3)
LYMPHOCYTES # BLD AUTO: 12.9 % — LOW (ref 13–44)
MCHC RBC-ENTMCNC: 31.5 PG — SIGNIFICANT CHANGE UP (ref 27–34)
MCHC RBC-ENTMCNC: 33.1 G/DL — SIGNIFICANT CHANGE UP (ref 32–36)
MCV RBC AUTO: 95.2 FL — SIGNIFICANT CHANGE UP (ref 80–100)
MONOCYTES # BLD AUTO: 0.53 K/UL — SIGNIFICANT CHANGE UP (ref 0–0.9)
MONOCYTES NFR BLD AUTO: 8.6 % — SIGNIFICANT CHANGE UP (ref 2–14)
NEUTROPHILS # BLD AUTO: 4.72 K/UL — SIGNIFICANT CHANGE UP (ref 1.8–7.4)
NEUTROPHILS NFR BLD AUTO: 76.3 % — SIGNIFICANT CHANGE UP (ref 43–77)
NRBC # BLD: 0 /100 WBCS — SIGNIFICANT CHANGE UP (ref 0–0)
PLATELET # BLD AUTO: 282 K/UL — SIGNIFICANT CHANGE UP (ref 150–400)
RBC # BLD: 3.52 M/UL — LOW (ref 3.8–5.2)
RBC # FLD: 12.3 % — SIGNIFICANT CHANGE UP (ref 10.3–14.5)
WBC # BLD: 6.19 K/UL — SIGNIFICANT CHANGE UP (ref 3.8–10.5)
WBC # FLD AUTO: 6.19 K/UL — SIGNIFICANT CHANGE UP (ref 3.8–10.5)

## 2020-10-21 ENCOUNTER — APPOINTMENT (OUTPATIENT)
Dept: HEMATOLOGY ONCOLOGY | Facility: CLINIC | Age: 35
End: 2020-10-21
Payer: COMMERCIAL

## 2020-10-21 VITALS
SYSTOLIC BLOOD PRESSURE: 136 MMHG | WEIGHT: 203.93 LBS | HEIGHT: 60 IN | TEMPERATURE: 98.3 F | OXYGEN SATURATION: 99 % | DIASTOLIC BLOOD PRESSURE: 89 MMHG | BODY MASS INDEX: 40.04 KG/M2 | RESPIRATION RATE: 16 BRPM | HEART RATE: 100 BPM

## 2020-10-21 DIAGNOSIS — D68.59 OTHER PRIMARY THROMBOPHILIA: ICD-10-CM

## 2020-10-21 LAB
ALBUMIN SERPL ELPH-MCNC: 4.6 G/DL
ALP BLD-CCNC: 145 U/L
ALT SERPL-CCNC: 11 U/L
ANION GAP SERPL CALC-SCNC: 10 MMOL/L
AST SERPL-CCNC: 16 U/L
AT III PPP CHRO-ACNC: 127 %
BILIRUB SERPL-MCNC: 1 MG/DL
BUN SERPL-MCNC: 14 MG/DL
CALCIUM SERPL-MCNC: 10.2 MG/DL
CHLORIDE SERPL-SCNC: 105 MMOL/L
CO2 SERPL-SCNC: 22 MMOL/L
CREAT SERPL-MCNC: 1.21 MG/DL
GLUCOSE SERPL-MCNC: 97 MG/DL
POTASSIUM SERPL-SCNC: 4.4 MMOL/L
PROT C PPP CHRO-ACNC: 77 %
PROT S AG ACT/NOR PPP IA: 102 %
PROT S FREE PPP-ACNC: 91 % NORMAL
PROT SERPL-MCNC: 7.7 G/DL
SODIUM SERPL-SCNC: 138 MMOL/L

## 2020-10-21 PROCEDURE — 99214 OFFICE O/P EST MOD 30 MIN: CPT

## 2020-10-21 NOTE — END OF VISIT
[FreeTextEntry3] : 34 year old woman with history of renal failure, now s/p Renal transplant.  Transplantation had went well, and patient is now off dialysis, normal creatinine of 1.21.  With this, PRotein C activity improved to 77%.  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  Now that patient is off dialysis this is no longer an issue.  Recommend she stop the Eliquis.  Patient does have gallstones and an upcoming Cholecystectomy with Dr. Elena Mejia.  Patient is hematologically cleared and optimized for procedure, does not need Eliquis leading up to procedure.  She should still continue with routine post operative DVT prophylaxis if she requires a stay at the hospital afterwards.

## 2020-10-21 NOTE — PHYSICAL EXAM
[Fully active, able to carry on all pre-disease performance without restriction] : Status 0 - Fully active, able to carry on all pre-disease performance without restriction [Normal] : normal appearance, no rash, nodules, vesicles, ulcers, erythema [de-identified] : + RLQ scar

## 2020-10-21 NOTE — HISTORY OF PRESENT ILLNESS
[de-identified] : 35yo F w/ hx low protein C, hx recurrent pregnancy loss (no lupus anticoagulant), hx clot in AV graft in RUE (on Eliquis), ESRD s/p renal transplant (09/2019), Covid-19 who presents for follow up of hx low protein C.\par \par Had R renal transplant Dec 2019 with most recent renal function Cr 1.21 (from 8's). Since surgery has been doing well. Is currently on reduced dose eliquis 2.5mg with occasional easy bruising. Has been having abdominal pain daily and found to have gallstones. Is planning on having cholecystectomy but needed evaluation from hematology prior to procedure regarding recommendations for anticoagulation.\par \par Surgeon Dr. Elena Mejia

## 2020-10-21 NOTE — ASSESSMENT
[FreeTextEntry1] : 33yo F w/ hx low protein C, hx recurrent pregnancy loss (no lupus anticoagulant), hx clot in AV graft in RUE (on Eliquis), ESRD s/p renal transplant (09/2019) who presents for follow up of hx low protein C.\par \par Hx low protein C\par - low protein C in the setting of ESRD, now s/p renal transplant \par - labs show normal Protein S activity 91%, Protein C activity 77%, AT III activity 127%, Protein S Ag 102%\par - patient can now stop anticoagulation given exacerbating factor is now resolved and no longer use AV grafts for dialysis \par \par Case discussed with Dr. Obinna Hidalgo. Please see attending addendum.

## 2020-10-21 NOTE — REVIEW OF SYSTEMS
[Abdominal Pain] : abdominal pain [Negative] : Psychiatric [Vomiting] : no vomiting [Constipation] : no constipation [Diarrhea] : no diarrhea

## 2021-01-29 NOTE — ED ADULT TRIAGE NOTE - MEANS OF ARRIVAL
ambulatory Drysol Counseling:  I discussed with the patient the risks of drysol/aluminum chloride including but not limited to skin rash, itching, irritation, burning.

## 2021-06-30 NOTE — PACU DISCHARGE NOTE - HYDRATION STATUS:
RN called pt to reschedule today's appt. Pt does not need to be seen for 3 more weeks per last PA note. LVM for the pt to call back.   Satisfactory

## 2022-04-14 NOTE — ED PROVIDER NOTE - NSFOLLOWUPINSTRUCTIONS_ED_ALL_ED_FT
[place]
-Follow-up with your Primary Care Doctor in 5-7 days.  -Follow-up with OB/GYN in clinic on Friday. Call for appointment.   -Return to the Emergency Department for any worsening or concerning symptoms such as fevers, severe pain, trouble breathing, weakness or lightheadedness.  -Pickup any prescriptions prescribed to you and take as directed. Oxycodone was sent to your pharmacy. You can also take Tylenol, use as directed. You can also try Sitz baths, pickup at your pharmacy.

## 2022-07-10 NOTE — ED ADULT NURSE NOTE - CAS DISCH ACCOMP BY
Past Medical History:   Diagnosis Date    Overdose of illicit drug        History reviewed. No pertinent surgical history.    Review of patient's allergies indicates:  No Known Allergies    No current facility-administered medications on file prior to encounter.     Current Outpatient Medications on File Prior to Encounter   Medication Sig    ciprofloxacin HCl (CIPRO) 500 MG tablet SMARTSI Tablet(s) By Mouth Every 12 Hours    valACYclovir (VALTREX) 500 MG tablet Take 1 tablet (500 mg total) by mouth 2 (two) times daily.     Family History    Reviewed and Not Pertinent       Tobacco Use    Smoking status: Current Every Day Smoker     Types: Cigarettes    Smokeless tobacco: Never Used   Substance and Sexual Activity    Alcohol use: Not Currently    Drug use: Not Currently    Sexual activity: Yes     Partners: Male     Birth control/protection: Injection     Review of Systems   Constitutional:  Positive for fatigue. Negative for chills and fever.   HENT: Negative.  Negative for congestion, rhinorrhea, sore throat and trouble swallowing.    Eyes: Negative.  Negative for visual disturbance.   Respiratory:  Positive for cough and shortness of breath. Negative for wheezing.    Cardiovascular:  Positive for chest pain (pleuritic right sided). Negative for palpitations.   Gastrointestinal: Negative.  Negative for abdominal pain, diarrhea, nausea and vomiting.   Endocrine: Negative.    Genitourinary: Negative.  Negative for dysuria and flank pain.   Musculoskeletal: Negative.  Negative for back pain.   Skin: Negative.  Negative for rash.   Allergic/Immunologic: Negative.    Neurological: Negative.  Negative for speech difficulty, weakness, numbness and headaches.   Hematological: Negative.    Psychiatric/Behavioral: Negative.  Negative for hallucinations. The patient is not nervous/anxious.    All other systems reviewed and are negative.    Objective:     Vital Signs (Most Recent):  Temp: 98.3 °F (36.8 °C) (22  1723)  Pulse: 94 (07/09/22 2124)  Resp: 18 (07/09/22 2124)  BP: 111/66 (07/09/22 2124)  SpO2: 100 % (07/09/22 2124) Vital Signs (24h Range):  Temp:  [98.3 °F (36.8 °C)] 98.3 °F (36.8 °C)  Pulse:  [] 94  Resp:  [18] 18  SpO2:  [98 %-100 %] 100 %  BP: (111-144)/(66-73) 111/66     Weight: 56.1 kg (123 lb 12.6 oz)  Body mass index is 21.25 kg/m².    Physical Exam  Vitals and nursing note reviewed.   Constitutional:       General: She is awake. She is not in acute distress.     Appearance: She is ill-appearing.   HENT:      Head: Normocephalic and atraumatic.      Mouth/Throat:      Mouth: Mucous membranes are moist.   Eyes:      General: No scleral icterus.     Conjunctiva/sclera: Conjunctivae normal.   Cardiovascular:      Rate and Rhythm: Normal rate and regular rhythm.      Heart sounds: No murmur heard.  Pulmonary:      Effort: Pulmonary effort is normal. No respiratory distress.      Breath sounds: Rhonchi present. No wheezing.   Abdominal:      Palpations: Abdomen is soft.      Tenderness: There is no abdominal tenderness.   Musculoskeletal:         General: No swelling. Normal range of motion.      Cervical back: Neck supple.   Skin:     General: Skin is warm.      Coloration: Skin is not jaundiced.   Neurological:      General: No focal deficit present.      Mental Status: She is alert and oriented to person, place, and time. Mental status is at baseline.   Psychiatric:         Attention and Perception: Attention normal.         Speech: Speech normal.         Behavior: Behavior is cooperative.           Significant Labs: All pertinent labs within the past 24 hours have been reviewed.  BMP:   Recent Labs   Lab 07/09/22 2108   GLU 90   *   K 3.6   CL 92*   CO2 26   BUN 4*   CREATININE 0.6   CALCIUM 8.8     CBC:   Recent Labs   Lab 07/09/22 2108   WBC 10.23   HGB 9.6*   HCT 29.9*   *     CMP:   Recent Labs   Lab 07/09/22 2108   *   K 3.6   CL 92*   CO2 26   GLU 90   BUN 4*   CREATININE  0.6   CALCIUM 8.8   PROT 8.4   ALBUMIN 2.8*   BILITOT 0.8   ALKPHOS 104   AST 23   ALT 27   ANIONGAP 16   EGFRNONAA >60       Significant Imaging: I have reviewed all pertinent imaging results/findings within the past 24 hours.  I have reviewed and interpreted all pertinent imaging results/findings within the past 24 hours.  CT: I have reviewed all pertinent results/findings within the past 24 hours and my personal findings are:       Imaging Results              CT Chest Without Contrast (Final result)  Result time 07/09/22 20:23:30      Final result by Ambrose Still MD (07/09/22 20:23:30)                   Impression:      Right lower lobe pneumonia with cavity formation and air-fluid level measuring 5.1 x 2.2 x 4.5 cm suggestive of associated abscess    All CT scans at this facility use dose modulation, iterative reconstruction and/or weight based dosing when appropriate to reduce radiation dose to as low as reasonably achievable.      Electronically signed by: Cheko Boggs  Date:    07/09/2022  Time:    20:23               Narrative:    EXAMINATION:  CT CHEST WITHOUT CONTRAST    CLINICAL HISTORY:  Cough, persistent;abdnormal chest x-ray, radiologist recommended;    TECHNIQUE:  5 mm axial images were acquired using helical CT technique from the lung apices through costophrenic sulci.  No intravenous contrast was administered.    COMPARISON:  Prior radiograph    FINDINGS:  Air-fluid level involving the cavity in the medial right lower lobe measuring 5.1 x 2.2  x 4.5 cm worrisome for abscess.  There is adjacent consolidation involving the superior segment of right lower lobe extending to the basilar segment of right lower lobe.                                       X-Ray Chest PA And Lateral (Final result)  Result time 07/09/22 18:43:54      Final result by Ambrose Still MD (07/09/22 18:43:54)                   Impression:      Unusual focal opacity along the right hilar border with questionable air-fluid level.   Question pneumonia.  This is only seen on the frontal projection and not the lateral projection.  Recommend follow-up CT.      Electronically signed by: Cheko Boggs  Date:    07/09/2022  Time:    18:43               Narrative:    EXAMINATION:  XR CHEST PA AND LATERAL    CLINICAL HISTORY:  Cough, unspecified    TECHNIQUE:  PA and lateral views of the chest were performed.    COMPARISON:  None    FINDINGS:  Air-fluid level and opacity along the right hilar border.  No acute osseous injury.  Cardiac silhouette within normal limits.                                      I have independently reviewed and interpreted the EKG.     I have independently reviewed all pertinent labs within the past 24 hours.    I have independently reviewed, visualized and interpreted all pertinent imaging results within the past 24 hours and discussed the findings with the ED physician, Dr. Childress         Spouse/Self

## 2022-10-27 NOTE — DISCHARGE NOTE NURSING/CASE MANAGEMENT/SOCIAL WORK - NSDCVIVACCINE_GEN_ALL_CORE_FT
UA consistent with UTI and patient now more encephalopathic.     Comfort measures     No Vaccines Administered.

## 2023-01-01 NOTE — ED STATDOCS - OBJECTIVE STATEMENT
32yo F with PMH R nephrectomy, ESRD on HD M/W/F (x 8 years), protein C deficiency on coumadin, sent in by nephrologist for elevated INR on labs today. Pt reports lightheadedness, nausea, and generalized mild cramping abdominal pain. Denies fever/chills, CP, SOB, n/v/d, melena, BRBPR, hematuria, easy bleeding/bruising. Statement Selected

## 2023-12-26 NOTE — ED ADULT NURSE NOTE - NSFALLRSKUNASSIST_ED_ALL_ED
Patient's atorvastatin was sent to [facility].  However, she wants it sent to [facility] pharmacy due to insurance not covering it at [facility].   no

## 2024-11-14 NOTE — H&P PST ADULT - TOBACCO USE
Abdomen , soft, nontender, nondistended , no guarding or rigidity , no masses palpable , normal bowel sounds , Liver and Spleen , no hepatosplenomegaly , liver nontender , spleen not palpable
Never smoker

## 2025-06-25 NOTE — ED ADULT NURSE NOTE - TOBACCO SCREENING (FROM THE ASSIST)
Alternate toradol and norco every 3-4 hours for pain control.  Take flomax once in the morning.  Take antibiotic as prescribed to prevent infection.  Call urology office tomorrow to schedule follow-up appointment.  Return to ER if new or worsening symptoms.  
Statement Selected